# Patient Record
Sex: FEMALE | Race: WHITE | NOT HISPANIC OR LATINO | Employment: OTHER | URBAN - METROPOLITAN AREA
[De-identification: names, ages, dates, MRNs, and addresses within clinical notes are randomized per-mention and may not be internally consistent; named-entity substitution may affect disease eponyms.]

---

## 2023-10-29 ENCOUNTER — HOSPITAL ENCOUNTER (EMERGENCY)
Facility: HOSPITAL | Age: 88
Discharge: HOME | End: 2023-10-29
Attending: EMERGENCY MEDICINE
Payer: MEDICARE

## 2023-10-29 VITALS
SYSTOLIC BLOOD PRESSURE: 132 MMHG | HEIGHT: 65 IN | HEART RATE: 61 BPM | DIASTOLIC BLOOD PRESSURE: 61 MMHG | BODY MASS INDEX: 25.66 KG/M2 | TEMPERATURE: 98 F | WEIGHT: 154 LBS | RESPIRATION RATE: 20 BRPM | OXYGEN SATURATION: 95 %

## 2023-10-29 DIAGNOSIS — R55 SYNCOPE, UNSPECIFIED SYNCOPE TYPE: Primary | ICD-10-CM

## 2023-10-29 DIAGNOSIS — E86.0 DEHYDRATION: ICD-10-CM

## 2023-10-29 LAB
ALBUMIN SERPL BCP-MCNC: 3.2 G/DL (ref 3.5–5.2)
ALP SERPL-CCNC: 51 U/L (ref 55–135)
ALT SERPL W/O P-5'-P-CCNC: 18 U/L (ref 10–44)
ANION GAP SERPL CALC-SCNC: 4 MMOL/L (ref 8–16)
AST SERPL-CCNC: 32 U/L (ref 10–40)
BASOPHILS # BLD AUTO: 0.01 K/UL (ref 0–0.2)
BASOPHILS NFR BLD: 0.3 % (ref 0–1.9)
BILIRUB SERPL-MCNC: 0.5 MG/DL (ref 0.1–1)
BNP SERPL-MCNC: 138 PG/ML (ref 0–99)
BUN SERPL-MCNC: 15 MG/DL (ref 8–23)
CALCIUM SERPL-MCNC: 8.6 MG/DL (ref 8.7–10.5)
CHLORIDE SERPL-SCNC: 109 MMOL/L (ref 95–110)
CO2 SERPL-SCNC: 25 MMOL/L (ref 23–29)
CREAT SERPL-MCNC: 1.3 MG/DL (ref 0.5–1.4)
DIFFERENTIAL METHOD: ABNORMAL
EOSINOPHIL # BLD AUTO: 0 K/UL (ref 0–0.5)
EOSINOPHIL NFR BLD: 0 % (ref 0–8)
ERYTHROCYTE [DISTWIDTH] IN BLOOD BY AUTOMATED COUNT: 13.6 % (ref 11.5–14.5)
EST. GFR  (NO RACE VARIABLE): 40 ML/MIN/1.73 M^2
GLUCOSE SERPL-MCNC: 91 MG/DL (ref 70–110)
HCT VFR BLD AUTO: 36.4 % (ref 37–48.5)
HGB BLD-MCNC: 12.1 G/DL (ref 12–16)
IMM GRANULOCYTES # BLD AUTO: 0 K/UL (ref 0–0.04)
IMM GRANULOCYTES NFR BLD AUTO: 0 % (ref 0–0.5)
INR PPP: 1.1 (ref 0.8–1.2)
LIPASE SERPL-CCNC: 39 U/L (ref 4–60)
LYMPHOCYTES # BLD AUTO: 1 K/UL (ref 1–4.8)
LYMPHOCYTES NFR BLD: 25.6 % (ref 18–48)
MCH RBC QN AUTO: 30 PG (ref 27–31)
MCHC RBC AUTO-ENTMCNC: 33.2 G/DL (ref 32–36)
MCV RBC AUTO: 90 FL (ref 82–98)
MONOCYTES # BLD AUTO: 0.7 K/UL (ref 0.3–1)
MONOCYTES NFR BLD: 18.2 % (ref 4–15)
NEUTROPHILS # BLD AUTO: 2.2 K/UL (ref 1.8–7.7)
NEUTROPHILS NFR BLD: 55.9 % (ref 38–73)
NRBC BLD-RTO: 0 /100 WBC
PLATELET # BLD AUTO: 125 K/UL (ref 150–450)
PMV BLD AUTO: 11 FL (ref 9.2–12.9)
POTASSIUM SERPL-SCNC: 3.8 MMOL/L (ref 3.5–5.1)
PROT SERPL-MCNC: 5.4 G/DL (ref 6–8.4)
PROTHROMBIN TIME: 11.7 SEC (ref 9–12.5)
RBC # BLD AUTO: 4.03 M/UL (ref 4–5.4)
SODIUM SERPL-SCNC: 138 MMOL/L (ref 136–145)
TROPONIN I SERPL HS-MCNC: 14.7 PG/ML (ref 0–14.9)
WBC # BLD AUTO: 3.91 K/UL (ref 3.9–12.7)

## 2023-10-29 PROCEDURE — 83690 ASSAY OF LIPASE: CPT | Performed by: EMERGENCY MEDICINE

## 2023-10-29 PROCEDURE — 83880 ASSAY OF NATRIURETIC PEPTIDE: CPT | Performed by: EMERGENCY MEDICINE

## 2023-10-29 PROCEDURE — 85610 PROTHROMBIN TIME: CPT | Performed by: EMERGENCY MEDICINE

## 2023-10-29 PROCEDURE — 93005 ELECTROCARDIOGRAM TRACING: CPT | Performed by: GENERAL PRACTICE

## 2023-10-29 PROCEDURE — 63600175 PHARM REV CODE 636 W HCPCS: Performed by: EMERGENCY MEDICINE

## 2023-10-29 PROCEDURE — 99285 EMERGENCY DEPT VISIT HI MDM: CPT | Mod: 25

## 2023-10-29 PROCEDURE — 85025 COMPLETE CBC W/AUTO DIFF WBC: CPT | Performed by: EMERGENCY MEDICINE

## 2023-10-29 PROCEDURE — 84484 ASSAY OF TROPONIN QUANT: CPT | Performed by: EMERGENCY MEDICINE

## 2023-10-29 PROCEDURE — 96374 THER/PROPH/DIAG INJ IV PUSH: CPT

## 2023-10-29 PROCEDURE — 93010 ELECTROCARDIOGRAM REPORT: CPT | Mod: ,,, | Performed by: GENERAL PRACTICE

## 2023-10-29 PROCEDURE — 93010 EKG 12-LEAD: ICD-10-PCS | Mod: ,,, | Performed by: GENERAL PRACTICE

## 2023-10-29 PROCEDURE — 80053 COMPREHEN METABOLIC PANEL: CPT | Performed by: EMERGENCY MEDICINE

## 2023-10-29 RX ORDER — ONDANSETRON 2 MG/ML
4 INJECTION INTRAMUSCULAR; INTRAVENOUS
Status: COMPLETED | OUTPATIENT
Start: 2023-10-29 | End: 2023-10-29

## 2023-10-29 RX ADMIN — ONDANSETRON 4 MG: 2 INJECTION INTRAMUSCULAR; INTRAVENOUS at 12:10

## 2023-10-29 NOTE — ED PROVIDER NOTES
Encounter Date: 10/29/2023       History     Chief Complaint   Patient presents with    Loss of Consciousness     Pt has been having diarrhea for the last three days with little PO intake. The daughter stated that the pt had a syncope episode with the pt striking her face on the wall. The pt has no recollection of what happened besides getting weaker this morning than the last couple days.     Patient presents complaining of diarrhea illness.  Patient has been having intermittent diarrhea for the last few days.  Patient felt weak today and had an episode of near-syncope patient fell and hit her head.  There was questionable loss of consciousness.  She complains of pain to the head.  She denies any abdominal pain.  She denies any bloody diarrhea.  At the worst symptoms are moderate.      Review of patient's allergies indicates:   Allergen Reactions    Sulfa (sulfonamide antibiotics)     Opioids - morphine analogues Nausea And Vomiting     No past medical history on file.  No past surgical history on file.  No family history on file.     Review of Systems   All other systems reviewed and are negative.      Physical Exam     Initial Vitals [10/29/23 1155]   BP Pulse Resp Temp SpO2   133/71 63 16 98 °F (36.7 °C) 98 %      MAP       --         Physical Exam    Nursing note and vitals reviewed.  Constitutional: She appears well-developed and well-nourished.   Pleasant, polite   HENT:   Head: Normocephalic.   There is mild swelling and tenderness to the posterior scalp   Eyes: EOM are normal.   Neck: Neck supple.   Normal range of motion.  Cardiovascular:  Normal rate, regular rhythm, normal heart sounds and intact distal pulses.           Pulmonary/Chest: Breath sounds normal. No respiratory distress.   Abdominal: Abdomen is soft.   Musculoskeletal:         General: Normal range of motion.      Cervical back: Normal range of motion and neck supple.     Neurological: She is alert and oriented to person, place, and time.  She has normal strength.   Skin: Skin is warm and dry. Capillary refill takes less than 2 seconds.   Psychiatric: She has a normal mood and affect. Her behavior is normal. Judgment and thought content normal.         ED Course   Procedures  Labs Reviewed   CBC W/ AUTO DIFFERENTIAL - Abnormal; Notable for the following components:       Result Value    Hematocrit 36.4 (*)     Platelets 125 (*)     Mono % 18.2 (*)     All other components within normal limits   COMPREHENSIVE METABOLIC PANEL - Abnormal; Notable for the following components:    Calcium 8.6 (*)     Total Protein 5.4 (*)     Albumin 3.2 (*)     Alkaline Phosphatase 51 (*)     eGFR 40.0 (*)     Anion Gap 4 (*)     All other components within normal limits   B-TYPE NATRIURETIC PEPTIDE - Abnormal; Notable for the following components:     (*)     All other components within normal limits   TROPONIN I HIGH SENSITIVITY   PROTIME-INR   LIPASE   LIPASE        ECG Results              EKG 12-lead (Final result)  Result time 10/29/23 17:49:04      Final result by Interface, Lab In [facility] (10/29/23 17:49:04)                   Narrative:    Test Reason : R07.9,    Vent. Rate : 064 BPM     Atrial Rate : 064 BPM     P-R Int : 240 ms          QRS Dur : 094 ms      QT Int : 450 ms       P-R-T Axes : 061 -46 010 degrees     QTc Int : 464 ms    Sinus rhythm with 1st degree A-V block with Premature atrial complexes  Left anterior fascicular block  Possible Anterior infarct ,age undetermined  Abnormal ECG  No previous ECGs available  Confirmed by Sunita GUTIÉRREZ, Kodi LUGO ([id number]) on 10/29/2023 5:48:56 PM    Referred By: [id number]   SELF           Confirmed By:Kodi Dorsey MD                                  Imaging Results              CT Head Without Contrast (Final result)  Result time 10/29/23 13:16:19      Final result by Brett Uribe MD (10/29/23 13:16:19)                   Narrative:    CMS MANDATED QUALITY DATA-CT RADIATION DOSE-436  All CT scans at this  facility dose modulation, iterative reconstruction, and or weight-based dosing when appropriate to reduce radiation dose to as low as reasonably achievable.    HISTORY: Head trauma, intracranial venous injury suspected    FINDINGS: No prior studies for comparison. There is no acute intracranial hemorrhage, with no mass effect or abnormal extra-axial fluid. Scattered areas of nonspecific hypoattenuation involve the subcortical and deep periventricular white matter, with gray-white differentiation maintained.    There is mild generalized prominence of the cortical sulci and ventricles. The cerebellum and brainstem are unremarkable. There are carotid siphon vascular calcifications. The visualized paranasal sinuses and mastoid air cells are clear. No acute calvarial fracture or scalp hematoma.    IMPRESSION: No acute intracranial hemorrhage or acute calvarial fracture.    Electronically signed by:  Brett Uribe MD  10/29/2023 01:16 PM CDT Workstation: [id number]                                     X-Ray Chest AP Portable (Final result)  Result time 10/29/23 12:55:56      Final result by Brett Uribe MD (10/29/23 12:55:56)                   Narrative:    HISTORY: chest pain    FINDINGS: Portable chest radiograph at 1220 hours with no prior studies for comparison shows the cardiomediastinal silhouette and pulmonary vasculature are within normal limits. There are scattered aortic vascular calcifications.    The lungs are normally expanded, with no consolidation, large pleural effusion, or evidence of pulmonary edema. No confluent infiltrates or pneumothorax. No acute fractures.    IMPRESSION: No evidence of acute cardiopulmonary disease.    Electronically signed by:  Brett Uribe MD  10/29/2023 12:55 PM CDT Workstation: [id number]                                     Medications   ondansetron injection 4 mg (4 mg Intravenous Given 10/29/23 1222)     Medical Decision Making  Patient appears in no acute  distress    Considerations include but are not limited to acute kidney injury, dehydration, electrolyte abnormalities, syncope, near-syncope, dysrhythmia    In the emergency department patient's blood work is within normal limits.  There is no evidence of any severe dehydration.  Patient did receive 1 L IV fluids in the emergency department and feels markedly improved.  Blood work was reviewed.  EKGs shows no evidence of any ST changes.  EKGs regular rate rhythm without ST elevation.  Head CT shows no evidence of any acute abnormality.  Patient was advised oral hydration therapy at home.  Patient has had no ectopy or any evidence of any dysrhythmia in the emergency department.  Likely patient experiencing dehydration and decreased volume causing a near syncopal event.  She was advised to follow up with her primary care doctor.  Patient will be discharged stable condition.  Detailed return precautions discussed.    Amount and/or Complexity of Data Reviewed  Labs: ordered. Decision-making details documented in ED Course.  Radiology: ordered.    Risk  Prescription drug management.               ED Course as of 10/29/23 1820   Sun Oct 29, 2023   1310 Sodium: 138 [AP]   1310 Potassium: 3.8 [AP]   1310 Chloride: 109 [AP]   1310 CO2: 25 [AP]   1310 Glucose: 91 [AP]   1310 BUN: 15 [AP]   1310 Calcium(!): 8.6 [AP]   1310 PROTEIN TOTAL(!): 5.4 [AP]   1310 Albumin(!): 3.2 [AP]   1310 BILIRUBIN TOTAL: 0.5 [AP]   1310 ALP(!): 51 [AP]   1310 AST: 32 [AP]   1310 ALT: 18 [AP]   1310 eGFR(!): 40.0 [AP]   1310 WBC: 3.91 [AP]   1310 Hemoglobin: 12.1 [AP]   1310 Hematocrit(!): 36.4 [AP]   1311 Platelet Count(!): 125 [AP]   1311 INR: 1.1 [AP]   1311 Protime: 11.7 [AP]   1311 Lipase: 39 [AP]   1455 BNP(!): 138 [AP]   1455 Troponin I High Sensitivity: 14.7 [AP]   1455 Lipase: 39 [AP]   1455 Protime: 11.7 [AP]   1455 INR: 1.1 [AP]   1455 WBC: 3.91 [AP]   1455 Hemoglobin: 12.1 [AP]   1455 Hematocrit(!): 36.4 [AP]   1455 Sodium: 138 [AP]    1455 Potassium: 3.8 [AP]   1455 Chloride: 109 [AP]   1455 CO2: 25 [AP]   1455 Glucose: 91 [AP]   1455 BUN: 15 [AP]   1455 Creatinine: 1.3 [AP]   1455 PROTEIN TOTAL(!): 5.4 [AP]   1455 Albumin(!): 3.2 [AP]   1455 BILIRUBIN TOTAL: 0.5 [AP]   1455 Anion Gap(!): 4 [AP]   1455 ALT: 18 [AP]   1455 AST: 32 [AP]   1455 ALP(!): 51 [AP]      ED Course User Index  [AP] Ric Johnson MD                    Clinical Impression:   Final diagnoses:  [R55] Syncope, unspecified syncope type (Primary)  [E86.0] Dehydration        ED Disposition Condition    Discharge Stable          ED Prescriptions    None       Follow-up Information       Follow up With Specialties Details Why Contact Info    Your doctor in the next few days                 Ric Johnson MD  10/29/23 [id number]

## 2023-11-01 ENCOUNTER — HOSPITAL ENCOUNTER (INPATIENT)
Facility: HOSPITAL | Age: 88
LOS: 1 days | Discharge: HOME HEALTH | DRG: 178 | End: 2023-11-03
Attending: EMERGENCY MEDICINE | Admitting: INTERNAL MEDICINE
Payer: MEDICARE

## 2023-11-01 DIAGNOSIS — R40.20 LOSS OF CONSCIOUSNESS: ICD-10-CM

## 2023-11-01 DIAGNOSIS — I48.91 ATRIAL FIBRILLATION, UNSPECIFIED TYPE: Primary | ICD-10-CM

## 2023-11-01 DIAGNOSIS — R55 SYNCOPE: ICD-10-CM

## 2023-11-01 PROBLEM — I10 HYPERTENSIVE DISORDER: Status: ACTIVE | Noted: 2023-11-01

## 2023-11-01 PROBLEM — E78.00 HYPERCHOLESTEROLEMIA: Status: ACTIVE | Noted: 2023-11-01

## 2023-11-01 LAB
ALBUMIN SERPL BCP-MCNC: 3.5 G/DL (ref 3.5–5.2)
ALP SERPL-CCNC: 60 U/L (ref 55–135)
ALT SERPL W/O P-5'-P-CCNC: 14 U/L (ref 10–44)
AMPHET+METHAMPHET UR QL: NEGATIVE
ANION GAP SERPL CALC-SCNC: 4 MMOL/L (ref 8–16)
AST SERPL-CCNC: 22 U/L (ref 10–40)
BARBITURATES UR QL SCN>200 NG/ML: NEGATIVE
BASOPHILS # BLD AUTO: 0.01 K/UL (ref 0–0.2)
BASOPHILS NFR BLD: 0.3 % (ref 0–1.9)
BENZODIAZ UR QL SCN>200 NG/ML: NEGATIVE
BILIRUB SERPL-MCNC: 0.6 MG/DL (ref 0.1–1)
BNP SERPL-MCNC: 193 PG/ML (ref 0–99)
BUN SERPL-MCNC: 19 MG/DL (ref 8–23)
BZE UR QL SCN: NEGATIVE
CALCIUM SERPL-MCNC: 9.6 MG/DL (ref 8.7–10.5)
CANNABINOIDS UR QL SCN: NEGATIVE
CHLORIDE SERPL-SCNC: 108 MMOL/L (ref 95–110)
CK SERPL-CCNC: 76 U/L (ref 20–180)
CO2 SERPL-SCNC: 27 MMOL/L (ref 23–29)
CREAT SERPL-MCNC: 1.1 MG/DL (ref 0.5–1.4)
CREAT UR-MCNC: 60.3 MG/DL (ref 15–325)
DIFFERENTIAL METHOD: ABNORMAL
EOSINOPHIL # BLD AUTO: 0 K/UL (ref 0–0.5)
EOSINOPHIL NFR BLD: 0.9 % (ref 0–8)
ERYTHROCYTE [DISTWIDTH] IN BLOOD BY AUTOMATED COUNT: 13.5 % (ref 11.5–14.5)
EST. GFR  (NO RACE VARIABLE): 48.9 ML/MIN/1.73 M^2
GLUCOSE SERPL-MCNC: 130 MG/DL (ref 70–110)
HCT VFR BLD AUTO: 38.1 % (ref 37–48.5)
HGB BLD-MCNC: 12.7 G/DL (ref 12–16)
IMM GRANULOCYTES # BLD AUTO: 0 K/UL (ref 0–0.04)
IMM GRANULOCYTES NFR BLD AUTO: 0 % (ref 0–0.5)
LIPASE SERPL-CCNC: 50 U/L (ref 4–60)
LYMPHOCYTES # BLD AUTO: 1.4 K/UL (ref 1–4.8)
LYMPHOCYTES NFR BLD: 42.6 % (ref 18–48)
MAGNESIUM SERPL-MCNC: 1.8 MG/DL (ref 1.6–2.6)
MCH RBC QN AUTO: 29.8 PG (ref 27–31)
MCHC RBC AUTO-ENTMCNC: 33.3 G/DL (ref 32–36)
MCV RBC AUTO: 89 FL (ref 82–98)
MONOCYTES # BLD AUTO: 0.4 K/UL (ref 0.3–1)
MONOCYTES NFR BLD: 11.4 % (ref 4–15)
NEUTROPHILS # BLD AUTO: 1.4 K/UL (ref 1.8–7.7)
NEUTROPHILS NFR BLD: 44.8 % (ref 38–73)
NRBC BLD-RTO: 0 /100 WBC
OPIATES UR QL SCN: NEGATIVE
PCP UR QL SCN>25 NG/ML: NEGATIVE
PLATELET # BLD AUTO: 133 K/UL (ref 150–450)
PMV BLD AUTO: 11.1 FL (ref 9.2–12.9)
POTASSIUM SERPL-SCNC: 4 MMOL/L (ref 3.5–5.1)
PROT SERPL-MCNC: 5.9 G/DL (ref 6–8.4)
RBC # BLD AUTO: 4.26 M/UL (ref 4–5.4)
SODIUM SERPL-SCNC: 139 MMOL/L (ref 136–145)
TOXICOLOGY INFORMATION: NORMAL
TROPONIN I SERPL HS-MCNC: 7.4 PG/ML (ref 0–14.9)
TROPONIN I SERPL HS-MCNC: 7.8 PG/ML (ref 0–14.9)
TSH SERPL DL<=0.005 MIU/L-ACNC: 1.32 UIU/ML (ref 0.34–5.6)
WBC # BLD AUTO: 3.17 K/UL (ref 3.9–12.7)

## 2023-11-01 PROCEDURE — 99285 EMERGENCY DEPT VISIT HI MDM: CPT | Mod: 25

## 2023-11-01 PROCEDURE — G0378 HOSPITAL OBSERVATION PER HR: HCPCS

## 2023-11-01 PROCEDURE — 93010 EKG 12-LEAD: ICD-10-PCS | Mod: ,,, | Performed by: INTERNAL MEDICINE

## 2023-11-01 PROCEDURE — 83735 ASSAY OF MAGNESIUM: CPT | Performed by: EMERGENCY MEDICINE

## 2023-11-01 PROCEDURE — 84443 ASSAY THYROID STIM HORMONE: CPT | Performed by: EMERGENCY MEDICINE

## 2023-11-01 PROCEDURE — 25500020 PHARM REV CODE 255: Performed by: EMERGENCY MEDICINE

## 2023-11-01 PROCEDURE — 80307 DRUG TEST PRSMV CHEM ANLYZR: CPT | Performed by: EMERGENCY MEDICINE

## 2023-11-01 PROCEDURE — 82550 ASSAY OF CK (CPK): CPT | Performed by: EMERGENCY MEDICINE

## 2023-11-01 PROCEDURE — 84484 ASSAY OF TROPONIN QUANT: CPT | Performed by: EMERGENCY MEDICINE

## 2023-11-01 PROCEDURE — 85025 COMPLETE CBC W/AUTO DIFF WBC: CPT | Performed by: EMERGENCY MEDICINE

## 2023-11-01 PROCEDURE — 83690 ASSAY OF LIPASE: CPT | Performed by: EMERGENCY MEDICINE

## 2023-11-01 PROCEDURE — 80053 COMPREHEN METABOLIC PANEL: CPT | Performed by: EMERGENCY MEDICINE

## 2023-11-01 PROCEDURE — 93005 ELECTROCARDIOGRAM TRACING: CPT | Performed by: INTERNAL MEDICINE

## 2023-11-01 PROCEDURE — 93010 ELECTROCARDIOGRAM REPORT: CPT | Mod: ,,, | Performed by: INTERNAL MEDICINE

## 2023-11-01 PROCEDURE — 83880 ASSAY OF NATRIURETIC PEPTIDE: CPT | Performed by: EMERGENCY MEDICINE

## 2023-11-01 RX ORDER — LOSARTAN POTASSIUM 50 MG/1
50 TABLET ORAL 2 TIMES DAILY
Status: DISCONTINUED | OUTPATIENT
Start: 2023-11-02 | End: 2023-11-03 | Stop reason: HOSPADM

## 2023-11-01 RX ORDER — DILTIAZEM HYDROCHLORIDE 180 MG/1
180 CAPSULE, COATED, EXTENDED RELEASE ORAL DAILY
Status: DISCONTINUED | OUTPATIENT
Start: 2023-11-02 | End: 2023-11-03 | Stop reason: HOSPADM

## 2023-11-01 RX ORDER — ATORVASTATIN CALCIUM 40 MG/1
40 TABLET, FILM COATED ORAL DAILY
Status: DISCONTINUED | OUTPATIENT
Start: 2023-11-02 | End: 2023-11-03 | Stop reason: HOSPADM

## 2023-11-01 RX ORDER — ONDANSETRON 2 MG/ML
4 INJECTION INTRAMUSCULAR; INTRAVENOUS EVERY 8 HOURS PRN
Status: DISCONTINUED | OUTPATIENT
Start: 2023-11-02 | End: 2023-11-03 | Stop reason: HOSPADM

## 2023-11-01 RX ORDER — FLECAINIDE ACETATE 50 MG/1
50 TABLET ORAL 2 TIMES DAILY
Status: DISCONTINUED | OUTPATIENT
Start: 2023-11-02 | End: 2023-11-03 | Stop reason: HOSPADM

## 2023-11-01 RX ORDER — ATORVASTATIN CALCIUM 40 MG/1
40 TABLET, FILM COATED ORAL DAILY
COMMUNITY
Start: 2023-07-19

## 2023-11-01 RX ORDER — ESTROGENS, CONJUGATED 0.3 MG/1
0.3 TABLET, FILM COATED ORAL DAILY
COMMUNITY
Start: 2023-10-07

## 2023-11-01 RX ORDER — FLECAINIDE ACETATE 50 MG/1
50 TABLET ORAL 2 TIMES DAILY
COMMUNITY
Start: 2023-08-03

## 2023-11-01 RX ORDER — CELECOXIB 100 MG/1
200 CAPSULE ORAL DAILY
Status: DISCONTINUED | OUTPATIENT
Start: 2023-11-02 | End: 2023-11-03 | Stop reason: HOSPADM

## 2023-11-01 RX ORDER — LANOLIN ALCOHOL/MO/W.PET/CERES
800 CREAM (GRAM) TOPICAL
Status: DISCONTINUED | OUTPATIENT
Start: 2023-11-02 | End: 2023-11-03 | Stop reason: HOSPADM

## 2023-11-01 RX ORDER — SODIUM CHLORIDE 9 MG/ML
INJECTION, SOLUTION INTRAVENOUS CONTINUOUS
Status: DISCONTINUED | OUTPATIENT
Start: 2023-11-02 | End: 2023-11-02

## 2023-11-01 RX ORDER — LOSARTAN POTASSIUM 50 MG/1
50 TABLET ORAL 2 TIMES DAILY
COMMUNITY
Start: 2023-08-09

## 2023-11-01 RX ORDER — CELECOXIB 200 MG/1
200 CAPSULE ORAL DAILY
COMMUNITY
Start: 2023-09-14

## 2023-11-01 RX ORDER — CETIRIZINE HYDROCHLORIDE 10 MG/1
10 TABLET ORAL DAILY
COMMUNITY
Start: 2023-10-07

## 2023-11-01 RX ORDER — DILTIAZEM HYDROCHLORIDE 180 MG/1
180 CAPSULE, COATED, EXTENDED RELEASE ORAL DAILY
COMMUNITY
Start: 2023-09-28

## 2023-11-01 RX ORDER — ACETAMINOPHEN 325 MG/1
650 TABLET ORAL EVERY 8 HOURS PRN
Status: DISCONTINUED | OUTPATIENT
Start: 2023-11-02 | End: 2023-11-03 | Stop reason: HOSPADM

## 2023-11-01 RX ORDER — DICLOFENAC SODIUM 10 MG/G
2 GEL TOPICAL 4 TIMES DAILY
COMMUNITY
Start: 2023-09-28

## 2023-11-01 RX ORDER — APIXABAN 5 MG/1
5 TABLET, FILM COATED ORAL 2 TIMES DAILY
COMMUNITY
Start: 2023-10-15

## 2023-11-01 RX ORDER — SODIUM CHLORIDE 0.9 % (FLUSH) 0.9 %
2 SYRINGE (ML) INJECTION
Status: DISCONTINUED | OUTPATIENT
Start: 2023-11-02 | End: 2023-11-03 | Stop reason: HOSPADM

## 2023-11-01 RX ORDER — SODIUM,POTASSIUM PHOSPHATES 280-250MG
2 POWDER IN PACKET (EA) ORAL
Status: DISCONTINUED | OUTPATIENT
Start: 2023-11-02 | End: 2023-11-03 | Stop reason: HOSPADM

## 2023-11-01 RX ORDER — TALC
6 POWDER (GRAM) TOPICAL NIGHTLY PRN
Status: DISCONTINUED | OUTPATIENT
Start: 2023-11-01 | End: 2023-11-03 | Stop reason: HOSPADM

## 2023-11-01 RX ORDER — PROMETHAZINE HYDROCHLORIDE 25 MG/1
25 TABLET ORAL EVERY 6 HOURS PRN
Status: DISCONTINUED | OUTPATIENT
Start: 2023-11-02 | End: 2023-11-03 | Stop reason: HOSPADM

## 2023-11-01 RX ORDER — IBANDRONATE SODIUM 150 MG/1
1 TABLET, FILM COATED ORAL
COMMUNITY
End: 2023-11-01

## 2023-11-01 RX ADMIN — IOHEXOL 100 ML: 350 INJECTION, SOLUTION INTRAVENOUS at 11:11

## 2023-11-01 NOTE — Clinical Note
Diagnosis: Syncope [760229]   Future Attending Provider: RUBIO HANSON [[id number]]   Place in Observation: [facility] [[id number]]   Admitting Provider:: RUBIO ESCALANTE [[id number]]

## 2023-11-02 ENCOUNTER — CLINICAL SUPPORT (OUTPATIENT)
Dept: CARDIOLOGY | Facility: HOSPITAL | Age: 88
DRG: 178 | End: 2023-11-02
Attending: EMERGENCY MEDICINE
Payer: MEDICARE

## 2023-11-02 VITALS — BODY MASS INDEX: 25.66 KG/M2 | HEIGHT: 65 IN | WEIGHT: 154 LBS

## 2023-11-02 LAB
ALBUMIN SERPL BCP-MCNC: 3.2 G/DL (ref 3.5–5.2)
ALP SERPL-CCNC: 54 U/L (ref 55–135)
ALT SERPL W/O P-5'-P-CCNC: 13 U/L (ref 10–44)
ANION GAP SERPL CALC-SCNC: 4 MMOL/L (ref 8–16)
AORTIC ROOT ANNULUS: 2.5 CM
AORTIC VALVE CUSP SEPERATION: 1.8 CM
ASCENDING AORTA: 3.6 CM
AST SERPL-CCNC: 20 U/L (ref 10–40)
AV INDEX (PROSTH): 0.73
AV MEAN GRADIENT: 5 MMHG
AV PEAK GRADIENT: 9 MMHG
AV VALVE AREA BY VELOCITY RATIO: 2.32 CM²
AV VALVE AREA: 2.07 CM²
AV VELOCITY RATIO: 0.82
BASOPHILS # BLD AUTO: 0.02 K/UL (ref 0–0.2)
BASOPHILS NFR BLD: 0.6 % (ref 0–1.9)
BILIRUB SERPL-MCNC: 0.6 MG/DL (ref 0.1–1)
BSA FOR ECHO PROCEDURE: 1.79 M2
BUN SERPL-MCNC: 17 MG/DL (ref 8–23)
CALCIUM SERPL-MCNC: 8.9 MG/DL (ref 8.7–10.5)
CHLORIDE SERPL-SCNC: 109 MMOL/L (ref 95–110)
CO2 SERPL-SCNC: 26 MMOL/L (ref 23–29)
CREAT SERPL-MCNC: 1 MG/DL (ref 0.5–1.4)
CV ECHO LV RWT: 0.77 CM
DIFFERENTIAL METHOD: ABNORMAL
DOP CALC AO PEAK VEL: 1.5 M/S
DOP CALC AO VTI: 33.7 CM
DOP CALC LVOT AREA: 2.8 CM2
DOP CALC LVOT DIAMETER: 1.9 CM
DOP CALC LVOT PEAK VEL: 1.23 M/S
DOP CALC LVOT STROKE VOLUME: 69.71 CM3
DOP CALC MV VTI: 29 CM
DOP CALCLVOT PEAK VEL VTI: 24.6 CM
E WAVE DECELERATION TIME: 200 MSEC
E/A RATIO: 1.15
E/E' RATIO: 8.21 M/S
ECHO LV POSTERIOR WALL: 1.51 CM (ref 0.6–1.1)
EOSINOPHIL # BLD AUTO: 0 K/UL (ref 0–0.5)
EOSINOPHIL NFR BLD: 0.6 % (ref 0–8)
ERYTHROCYTE [DISTWIDTH] IN BLOOD BY AUTOMATED COUNT: 13.3 % (ref 11.5–14.5)
EST. GFR  (NO RACE VARIABLE): 54.9 ML/MIN/1.73 M^2
FRACTIONAL SHORTENING: 28 % (ref 28–44)
GLUCOSE SERPL-MCNC: 82 MG/DL (ref 70–110)
HCT VFR BLD AUTO: 36.3 % (ref 37–48.5)
HGB BLD-MCNC: 12.1 G/DL (ref 12–16)
IMM GRANULOCYTES # BLD AUTO: 0.01 K/UL (ref 0–0.04)
IMM GRANULOCYTES NFR BLD AUTO: 0.3 % (ref 0–0.5)
INTERVENTRICULAR SEPTUM: 1.15 CM (ref 0.6–1.1)
IVC DIAMETER: 1.94 CM
LEFT INTERNAL DIMENSION IN SYSTOLE: 2.81 CM (ref 2.1–4)
LEFT VENTRICLE DIASTOLIC VOLUME INDEX: 37.69 ML/M2
LEFT VENTRICLE DIASTOLIC VOLUME: 66.72 ML
LEFT VENTRICLE MASS INDEX: 106 G/M2
LEFT VENTRICLE SYSTOLIC VOLUME INDEX: 16.8 ML/M2
LEFT VENTRICLE SYSTOLIC VOLUME: 29.81 ML
LEFT VENTRICULAR INTERNAL DIMENSION IN DIASTOLE: 3.92 CM (ref 3.5–6)
LEFT VENTRICULAR MASS: 187.51 G
LV LATERAL E/E' RATIO: 7.09 M/S
LV SEPTAL E/E' RATIO: 9.75 M/S
LVOT MG: 3 MMHG
LVOT MV: 0.75 CM/S
LYMPHOCYTES # BLD AUTO: 2 K/UL (ref 1–4.8)
LYMPHOCYTES NFR BLD: 58.6 % (ref 18–48)
MCH RBC QN AUTO: 29.8 PG (ref 27–31)
MCHC RBC AUTO-ENTMCNC: 33.3 G/DL (ref 32–36)
MCV RBC AUTO: 89 FL (ref 82–98)
MONOCYTES # BLD AUTO: 0.4 K/UL (ref 0.3–1)
MONOCYTES NFR BLD: 11.5 % (ref 4–15)
MV MEAN GRADIENT: 1 MMHG
MV PEAK A VEL: 0.68 M/S
MV PEAK E VEL: 0.78 M/S
MV PEAK GRADIENT: 3 MMHG
MV STENOSIS PRESSURE HALF TIME: 61 MS
MV VALVE AREA BY CONTINUITY EQUATION: 2.4 CM2
MV VALVE AREA P 1/2 METHOD: 3.61 CM2
NEUTROPHILS # BLD AUTO: 1 K/UL (ref 1.8–7.7)
NEUTROPHILS NFR BLD: 28.4 % (ref 38–73)
NRBC BLD-RTO: 0 /100 WBC
OHS LV EJECTION FRACTION SIMPSONS BIPLANE MOD: 57 %
PISA TR MAX VEL: 2.15 M/S
PLATELET # BLD AUTO: 139 K/UL (ref 150–450)
PMV BLD AUTO: 11.2 FL (ref 9.2–12.9)
POTASSIUM SERPL-SCNC: 3.8 MMOL/L (ref 3.5–5.1)
PROT SERPL-MCNC: 5.5 G/DL (ref 6–8.4)
PV MV: 0.65 M/S
PV PEAK GRADIENT: 3 MMHG
PV PEAK VELOCITY: 0.89 M/S
RA PRESSURE ESTIMATED: 3 MMHG
RA PRESSURE: 3 MMHG
RBC # BLD AUTO: 4.06 M/UL (ref 4–5.4)
RV TB RVSP: 5 MMHG
RV TISSUE DOPPLER FREE WALL SYSTOLIC VELOCITY 1 (APICAL 4 CHAMBER VIEW): 14.8 CM/S
SARS-COV-2 RDRP RESP QL NAA+PROBE: POSITIVE
SINUS: 2.57 CM
SODIUM SERPL-SCNC: 139 MMOL/L (ref 136–145)
STJ: 2.17 CM
TDI LATERAL: 0.11 M/S
TDI SEPTAL: 0.08 M/S
TDI: 0.1 M/S
TR MAX PG: 18 MMHG
TRICUSPID ANNULAR PLANE SYSTOLIC EXCURSION: 1.93 CM
TV REST PULMONARY ARTERY PRESSURE: 21 MMHG
VIT B12 SERPL-MCNC: >1500 PG/ML (ref 210–950)
WBC # BLD AUTO: 3.38 K/UL (ref 3.9–12.7)
Z-SCORE OF LEFT VENTRICULAR DIMENSION IN END DIASTOLE: -2.22
Z-SCORE OF LEFT VENTRICULAR DIMENSION IN END SYSTOLE: -0.58

## 2023-11-02 PROCEDURE — 93306 ECHO (CUPID ONLY): ICD-10-PCS | Mod: 26,,, | Performed by: INTERNAL MEDICINE

## 2023-11-02 PROCEDURE — 93306 TTE W/DOPPLER COMPLETE: CPT | Mod: 26,,, | Performed by: INTERNAL MEDICINE

## 2023-11-02 PROCEDURE — 93306 TTE W/DOPPLER COMPLETE: CPT

## 2023-11-02 PROCEDURE — 25000003 PHARM REV CODE 250

## 2023-11-02 PROCEDURE — 80053 COMPREHEN METABOLIC PANEL: CPT

## 2023-11-02 PROCEDURE — 82607 VITAMIN B-12: CPT

## 2023-11-02 PROCEDURE — 21400001 HC TELEMETRY ROOM

## 2023-11-02 PROCEDURE — 85025 COMPLETE CBC W/AUTO DIFF WBC: CPT

## 2023-11-02 PROCEDURE — U0002 COVID-19 LAB TEST NON-CDC: HCPCS | Performed by: INTERNAL MEDICINE

## 2023-11-02 PROCEDURE — 95819 EEG AWAKE AND ASLEEP: CPT

## 2023-11-02 RX ADMIN — LOSARTAN POTASSIUM 50 MG: 50 TABLET, FILM COATED ORAL at 09:11

## 2023-11-02 RX ADMIN — APIXABAN 5 MG: 5 TABLET, FILM COATED ORAL at 09:11

## 2023-11-02 RX ADMIN — ATORVASTATIN CALCIUM 40 MG: 40 TABLET, FILM COATED ORAL at 09:11

## 2023-11-02 RX ADMIN — SODIUM CHLORIDE: 0.9 INJECTION, SOLUTION INTRAVENOUS at 01:11

## 2023-11-02 RX ADMIN — FLECAINIDE ACETATE 50 MG: 50 TABLET ORAL at 09:11

## 2023-11-02 RX ADMIN — DILTIAZEM HYDROCHLORIDE 180 MG: 180 CAPSULE, COATED, EXTENDED RELEASE ORAL at 09:11

## 2023-11-02 NOTE — ASSESSMENT & PLAN NOTE
Patient with atrial fibrillation which is controlled currently with Calcium Channel Blocker and Flecainide. Patient is currently in sinus rhythm.[id number] Score: 3.  Anticoagulation indicated. Anticoagulation done with Home Eliquis.

## 2023-11-02 NOTE — PLAN OF CARE
11/02/23 1025   BLACKMON Message   Medicare Outpatient and Observation Notification regarding financial responsibility Given to patient/caregiver;Explained to patient/caregiver;Signed/date by patient/caregiver   Date BLACKMON was signed 11/02/23   Time BLACKMON was signed [id number]

## 2023-11-02 NOTE — ED PROVIDER NOTES
"Encounter Date: 11/1/2023       History     Chief Complaint   Patient presents with    Loss of Consciousness     Pt was seen in the ED last weekend for the same complaint then discharged with dehydration. The pt had another syncopal episode that lasted 8 minutes, the daughter was able to help pt to the floor. Pt is alert and oriented x3 in no acute distress.     88-year-old female with a history of hypertension, hyperlipidemia and paroxysmal atrial fibrillation presents for evaluation after a syncopal episode.  The patient was sitting on a stool at her daughter's work area/restaurant/bar.  She was not drinking alcohol.  She states she started feeling lightheaded and felt that she might pass out.  She called for assistance and was assisted to the ground.  She did lose consciousness.  She did not injure herself.  Her daughter states that she was unconscious for several minutes and then when she regained consciousness she seemed confused but there was no slurred speech.  She seemed very "sleepy" and did not have any aphasia or slurred speech.  She did not have any visualized seizure type activity or tonic-clonic activity.  She did not bite her tongue and did not have bladder or bowel incontinence.  She denies any preceding or associated chest pain, palpitations or shortness of breath.  She does have a frontal headache currently.  She does get headaches periodically but not frequently.  She denies neck pain or stiffness and denies any visual problems or changes.  She was seen for a similar episode in the emergency room within the past week.  She had had diarrhea at that time and symptoms were attributed to possible dehydration.  She felt better after hydration and was discharged home.  She states she is been feeling well since then until this event.  She is currently on Eliquis over the past 6 months and is not able to provide a definitive history for this but states that it was secondary to an abnormal heart rhythm " that her cardiologist saw on the monitor during a visit--paroxysmal atrial fibrillation suspected.  The patient denies any focal weakness, numbness tingling or paresthesias.  Denies abdominal pain vomiting or diarrhea although had had diarrhea prior to her previous episode.  Denies gastrointestinal bleeding or any urinary problems or changes.  Denies any other problems or complaints.        Review of patient's allergies indicates:   Allergen Reactions    Sulfa (sulfonamide antibiotics)     Opioids - morphine analogues Nausea And Vomiting     No past medical history on file.  No past surgical history on file.  No family history on file.     Review of Systems   Constitutional:  Positive for fatigue. Negative for activity change, appetite change, chills and fever.   HENT: Negative.  Negative for congestion, ear pain, rhinorrhea, sore throat and trouble swallowing.    Eyes: Negative.  Negative for photophobia, pain, redness and visual disturbance.   Respiratory: Negative.  Negative for cough, chest tightness, shortness of breath and wheezing.    Cardiovascular: Negative.  Negative for chest pain, palpitations and leg swelling.   Gastrointestinal: Negative.  Negative for abdominal distention, abdominal pain, blood in stool, constipation, diarrhea, nausea and vomiting.   Endocrine: Negative.    Genitourinary: Negative.  Negative for decreased urine volume, difficulty urinating, dysuria, flank pain, frequency, pelvic pain and urgency.   Musculoskeletal: Negative.  Negative for arthralgias, back pain, gait problem, myalgias, neck pain and neck stiffness.   Skin: Negative.  Negative for rash.   Neurological:  Positive for syncope, light-headedness and headaches. Negative for dizziness, facial asymmetry, speech difficulty, weakness and numbness.   Hematological:  Does not bruise/bleed easily.   Psychiatric/Behavioral: Negative.  Negative for confusion.    All other systems reviewed and are negative.      Physical Exam      Initial Vitals [11/01/23 2000]   BP Pulse Resp Temp SpO2   136/70 66 16 97.7 °F (36.5 °C) 97 %      MAP       --         Physical Exam    Nursing note and vitals reviewed.  Constitutional: She is not diaphoretic. She is active and cooperative.  Non-toxic appearance. She does not have a sickly appearance. She does not appear ill. No distress.   HENT:   Head: Normocephalic and atraumatic.   Right Ear: Tympanic membrane normal.   Left Ear: Tympanic membrane normal.   Nose: Nose normal.   Mouth/Throat: Uvula is midline, oropharynx is clear and moist and mucous membranes are normal. No oral lesions. No uvula swelling. No oropharyngeal exudate, posterior oropharyngeal edema or posterior oropharyngeal erythema.   Eyes: Conjunctivae, EOM and lids are normal. Pupils are equal, round, and reactive to light. No scleral icterus.   Neck: Trachea normal and phonation normal. Neck supple. No thyroid mass and no thyromegaly present. No stridor present. No JVD present.   Normal range of motion.   Full passive range of motion without pain.     Cardiovascular:  Normal rate, regular rhythm, normal heart sounds, intact distal pulses and normal pulses.     Exam reveals no gallop, no distant heart sounds, no friction rub and no decreased pulses.       No murmur heard.  Pulmonary/Chest: Effort normal and breath sounds normal. No accessory muscle usage or stridor. No tachypnea. No respiratory distress. She has no wheezes. She has no rhonchi. She has no rales.   Abdominal: Abdomen is soft. Bowel sounds are normal. She exhibits no distension, no pulsatile midline mass and no mass. There is no abdominal tenderness. There is no rigidity and no guarding.   Musculoskeletal:         General: No tenderness or edema. Normal range of motion.      Right hand: Normal. Normal range of motion. Normal strength. Normal sensation. Normal capillary refill. Normal pulse.      Left hand: Normal. Normal range of motion. Normal strength. Normal sensation.  Normal capillary refill. Normal pulse.      Cervical back: Normal, full passive range of motion without pain, normal range of motion and neck supple. No edema, erythema, rigidity or bony tenderness. No spinous process tenderness or muscular tenderness. Normal range of motion.      Thoracic back: Normal. No bony tenderness. Normal range of motion.      Lumbar back: Normal. No bony tenderness. Normal range of motion.      Right foot: Normal. Normal range of motion and normal capillary refill. No tenderness or bony tenderness. Normal pulse.      Left foot: Normal. Normal range of motion and normal capillary refill. No tenderness or bony tenderness. Normal pulse.      Comments: Pulses are 2+ throughout, cap refill is less than 2 sec throughout, extremities are nontender throughout with full range of motion. There is no spinal tenderness to palpation.     Neurological: She is alert and oriented to person, place, and time. She has normal strength. She displays normal reflexes. No cranial nerve deficit or sensory deficit. Gait normal.   No focal deficits.   Skin: Skin is warm, dry and intact. Capillary refill takes less than 2 seconds. No ecchymosis, no petechiae and no rash noted. No erythema. No pallor.   Psychiatric: She has a normal mood and affect. Her speech is normal and behavior is normal. Judgment and thought content normal. Cognition and memory are normal.         ED Course   Procedures  Labs Reviewed   CBC W/ AUTO DIFFERENTIAL - Abnormal; Notable for the following components:       Result Value    WBC 3.17 (*)     Platelets 133 (*)     Gran # (ANC) 1.4 (*)     All other components within normal limits   COMPREHENSIVE METABOLIC PANEL - Abnormal; Notable for the following components:    Glucose 130 (*)     Total Protein 5.9 (*)     eGFR 48.9 (*)     Anion Gap 4 (*)     All other components within normal limits   B-TYPE NATRIURETIC PEPTIDE - Abnormal; Notable for the following components:     (*)     All  other components within normal limits   LIPASE   CK   MAGNESIUM   TSH   TROPONIN I HIGH SENSITIVITY   D DIMER, QUANTITATIVE   DRUG SCREEN PANEL, URINE EMERGENCY   TROPONIN I HIGH SENSITIVITY   POCT CREATININE          Imaging Results              CT Head Without Contrast (Final result)  Result time 11/01/23 22:00:05      Final result by Lobito Crowell MD (11/01/23 22:00:05)                   Narrative:    EXAM DESCRIPTION:  CT HEAD WITHOUT CONTRAST  RadLex: CT HEAD WITHOUT IV CONTRAST    CLINICAL HISTORY:  88 years  Female;  Syncope, recurrent    TECHNOLOGIST NOTES: Loss of Consciousness (Pt was seen in the ED last weekend for the same complaint then discharged with dehydration. The pt had another syncopal episode that lasted 8 minutes, the daughter was able to help pt to the floor. Pt is alert and oriented x3 in; no acute distress.)    COMPARISONS: 10/29/2023    TECHNIQUE: Axial CT images were obtained from the skull base to vertex. This exam was performed according to our departmental dose-optimization program, which includes automated exposure control, adjustment of the mA and/or kV according to patient size and/or use of iterative reconstruction techniques.    FINDINGS:  No acute intracranial hemorrhage. No mass effect, midline shift or hydrocephalus. The gray-white matter differentiation is grossly unremarkable. No evidence of acute large territory infarct.   Within the broad range of normal, brain volume is age appropriate. Nonspecific low attenuation in the white matter bilaterally. This is most commonly related to age-indeterminate small vessel ischemic disease. The visualized paranasal sinuses are grossly unremarkable. The mastoid air cells are clear.      IMPRESSION:  1.  No acute intracranial process is identified.  2.  Nonspecific low attenuation in the white matter bilaterally. This is most commonly related to age-indeterminate small vessel ischemic disease,  3.  If symptoms persist or further imaging is  clinically indicated, consider follow-up MRI or repeat CT imaging    Electronically signed by:  Lobito Crowell MD  11/01/2023 10:00 PM CDT Workstation: [id number]                                     X-Ray Chest AP Portable (In process)                      Medications - No data to display  Medical Decision Making  Emergent evaluation of an 88-year-old female with complaints as per HPI.  She is hemodynamically stable.  She does not currently appear dehydrated.  She denies chest pain or shortness of breath.  She is well-appearing and in no distress.  Has a mild headache, no fever, photophobia, neck pain or stiffness or any focal weakness, numbness tingling or paresthesias.  This is the patient's 2nd syncopal episode in a week.  Was discharged after ED evaluation for 1st syncopal episode.  Is currently visiting her daughter from out of town.  Secondary to cardiac risk factors and current constellation of symptoms I have discussed the case with the hospitalist provider who has assumed care and will admit.  Orthostatics have been ordered and are pending.  CT scan of the brain is negative for evidence of acute intracranial abnormality.  EKG is negative for evidence of acute ischemia.  Troponin is not elevated.  No acute electrolyte abnormalities noted.    Amount and/or Complexity of Data Reviewed  Labs: ordered. Decision-making details documented in ED Course.  Radiology: ordered. Decision-making details documented in ED Course.  ECG/medicine tests:  Decision-making details documented in ED Course.                               Clinical Impression:   Final diagnoses:  [R55] Syncope               Janae Puentes MD  11/01/23 [id number]

## 2023-11-02 NOTE — CONSULTS
[facility]  Department of Neurology  Neurology Consultation Note        PATIENT NAME: Jessi Christianson  MRN: [id number]  CSN: [id number]      TODAY'S DATE: 11/02/2023  ADMIT DATE: 11/1/2023                            CONSULTING PROVIDER: Josue Rollins MD  CONSULT REQUESTED BY: June Gomez MD      Reason for consult: Syncopal spells       History obtained from chart review, the patient, and daughter.    HPI per EMR: Jessi Christianson is a 88 y.o. female with a history of atrial fibrillation, hypercholesterolemia, and HTN presents to the ED after losing consciousness for 8-10 minutes. Patient states that she has been traveling since 10/17 to visit several family members and she is currently in town visiting her daughter. Patient states that she was sitting on a stool at the house when she suddenly felt very hot, started to sweat, and then passed out. Patient's family was able to catch her and ease her to the ground however they stated that she continued to breath but was unconscious for several minutes and was even somnolent in EMS on the way to the ED. Patient states that she had a headache afterwards but denies any cough, SOB, chest pain, abdominal pain, nausea, vomiting, or diarrhea. Patient states that she has been eating normally and drank lots of water yesterday however she did not hydrate much today.       Patient was recently seen 10/29/2023 in the ED for a shorter episode of loss of consciousness, was found to be dehydrated, given fluids, and sent home.      ED: Vitals showed HR 66, /70, RR 16 saturating at 97% on RA. Labs were significant for troponin 7.4, second troponin 7.8, , WBC 3.17, and . CT head showed no acute intracranial process and Nonspecific low attenuation in the white matter bilaterally.    Neurology consult:  Patient was seen examined by me.  She presented to the hospital after episode of syncopal spell that happened yesterday while she was sitting on the  stool and suddenly felt like room spinning sensation and then she passed out.  She was unconscious for about 5-7 minutes after which she started to wake up however was somnolent after.  She felt very hot and sweaty prior to passing out.  Patient's family was able to catch her and helped her to the ground.  There was no generalized tonic-clonic activity.    Two days prior to this episode, she had a similar episode while she was standing in her daughter's office room when she passed out and she was brought to the hospital and she was found to be dehydrated.  She was given fluids and discharged home.    Patient denies any headache, vision loss, vision changes, speech trouble, nausea, vomiting, unilateral weakness or sensory changes.  Currently she feels back to baseline.    PREVIOUS MEDICAL HISTORY:  No past medical history on file.  PREVIOUS SURGICAL HISTORY:  No past surgical history on file.  FAMILY MEDICAL HISTORY:  No family history on file.  SOCIAL HISTORY:     ALLERGIES:  Review of patient's allergies indicates:   Allergen Reactions    Sulfa (sulfonamide antibiotics)     Opioids - morphine analogues Nausea And Vomiting     HOME MEDICATIONS:  Prior to Admission medications    Medication Sig Start Date End Date Taking? Authorizing Provider   atorvastatin (LIPITOR) 40 MG tablet Take 40 mg by mouth once daily. 7/19/23  Yes Provider, Historical   celecoxib (CELEBREX) 200 MG capsule Take 200 mg by mouth once daily. 9/14/23  Yes Provider, Historical   cetirizine (ZYRTEC) 10 MG tablet Take 10 mg by mouth once daily. 10/7/23  Yes Provider, Historical   diclofenac sodium (VOLTAREN) 1 % Gel Apply 2 g topically 4 (four) times daily. 9/28/23  Yes Provider, Historical   diltiaZEM (CARDIZEM CD) 180 MG 24 hr capsule Take 180 mg by mouth once daily. 9/28/23  Yes Provider, Historical   ELIQUIS 5 mg Tab Take 5 mg by mouth 2 (two) times daily. 10/15/23  Yes Provider, Historical   flecainide (TAMBOCOR) 50 MG Tab Take 50 mg by mouth  2 (two) times daily. 8/3/23  Yes Provider, Historical   losartan (COZAAR) 50 MG tablet Take 50 mg by mouth 2 (two) times daily. 8/9/23  Yes Provider, Historical   PREMARIN 0.3 mg tablet Take 0.3 mg by mouth once daily. 10/7/23   Provider, Historical     CURRENT SCHEDULED MEDICATIONS:   apixaban  5 mg Oral BID    atorvastatin  40 mg Oral Daily    celecoxib  200 mg Oral Daily    diltiaZEM  180 mg Oral Daily    flecainide  50 mg Oral BID    losartan  50 mg Oral BID     CURRENT INFUSIONS:   sodium chloride 0.9% 100 mL/hr at 11/02/23 0127     CURRENT PRN MEDICATIONS:  acetaminophen, acetaminophen, magnesium oxide, magnesium oxide, melatonin, ondansetron, potassium bicarbonate, potassium bicarbonate, potassium bicarbonate, potassium, sodium phosphates, potassium, sodium phosphates, potassium, sodium phosphates, promethazine, sodium chloride 0.9%    REVIEW OF SYSTEMS:  Please refer to the HPI for all pertinent positive and negative findings. A comprehensive review of all other systems was negative.       PHYSICAL EXAM:  Patient Vitals for the past 24 hrs:   BP Temp Temp src Pulse Resp SpO2 Height Weight   11/02/23 0900 (!) 156/70 -- -- 61 (!) 31 97 % -- --   11/02/23 0830 (!) 172/77 -- -- 64 (!) 30 98 % -- --   11/02/23 0730 (!) 165/72 97.9 °F (36.6 °C) Oral (!) 53 19 97 % -- --   11/02/23 0700 (!) 153/70 -- -- (!) 50 17 98 % -- --   11/02/23 0631 (!) 171/79 -- -- 61 18 99 % -- --   11/02/23 0600 (!) 141/67 -- -- (!) 51 15 96 % -- --   11/02/23 0530 (!) 155/70 -- -- (!) 54 19 96 % -- --   11/02/23 0500 (!) 163/70 -- -- (!) 53 14 97 % -- --   11/02/23 0430 (!) 156/72 -- -- (!) 58 18 99 % -- --   11/02/23 0400 (!) 144/64 -- -- (!) 50 16 96 % -- --   11/02/23 0330 (!) 165/70 -- -- (!) 52 20 98 % -- --   11/02/23 0100 (!) 155/74 -- -- (!) 59 18 99 % -- --   11/01/23 2300 (!) 175/73 -- -- (!) 55 -- -- -- --   11/01/23 2245 -- -- -- 60 19 100 % -- --   11/01/23 2229 (!) 164/77 -- -- 65 -- -- -- --   11/01/23 2227 (!) 163/76 --  "-- 60 -- -- -- --   11/01/23 2225 (!) 162/72 -- -- (!) 58 -- -- -- --   11/01/23 2100 (!) 153/70 -- -- 61 19 97 % -- --   11/01/23 2000 136/70 97.7 °F (36.5 °C) Oral 66 16 97 % 5' 5" (1.651 m) 69.9 kg (154 lb)       GENERAL APPEARANCE: Alert, well-developed, well-nourished female in no acute distress.  HEENT: Normocephalic and atraumatic. PERRL. Oropharynx unremarkable.  PULM: Normal respiratory effort. No accessory muscle use.  CV: RRR.  ABDOMEN: Soft, nontender.  EXTREMITIES: No obvious signs of vascular compromise. Pulses present. No cyanosis, clubbing or edema.  SKIN: Clear; no rashes, lesions or skin breaks in exposed areas.    NEURO:  MENTAL STATUS: Patient awake and oriented to time, place, and person, recent/remote memory normal, attention span/concentration normal, and speech fluent without paraphasic errors.  Affect euthymic.    CRANIAL NERVES:  CN I: Not tested.  CN II: Fundoscopic exam deferred.  CN III, IV, VI: Pupils equal, round and reactive to light.  Extraocular movements full and intact.  CN V: Facial sensation normal.  CN VII: Facial asymmetry absent.  CN VIII: Hearing grossly normal and equal bilaterally.  No skew deviation or pathologic nystagmus.  CN IX, X: Palate elevates symmetrically. Speech/articulation is clear without dysarthria.  CN XI: Shoulder shrug and chin rotation equal with good strength.  CN XII: Tongue protrusion midline.    MOTOR:  Bulk normal. Tone normal and symmetric throughout.  Abnormal movements absent.  Tremor: none present.  Strength 5/5 throughout.    REFLEXES:  DTRs 2+ throughout.  Plantar response downgoing bilaterally.  SENSATION: grossly intact throughout.  COORDINATION: normal finger-to-nose.  STATION: not tested.  GAIT: not tested.    Labs:  Recent Labs   Lab 10/29/23  1208 11/01/23 2027 11/02/23  0427    139 139   K 3.8 4.0 3.8    108 109   CO2 25 27 26   BUN 15 19 17   CREATININE 1.3 1.1 1.0   GLU 91 130* 82   CALCIUM 8.6* 9.6 8.9   MG  --  1.8  --  " "    Recent Labs   Lab 10/29/23  1208 11/01/23 2027 11/02/23  0427   WBC 3.91 3.17* 3.38*   HGB 12.1 12.7 12.1   HCT 36.4* 38.1 36.3*   * 133* 139*     Recent Labs   Lab 10/29/23  1208 11/01/23 2027 11/02/23  0427   ALBUMIN 3.2* 3.5 3.2*   PROT 5.4* 5.9* 5.5*   BILITOT 0.5 0.6 0.6   ALKPHOS 51* 60 54*   ALT 18 14 13   AST 32 22 20     Lab Results   Component Value Date    INR 1.1 10/29/2023     No results found for: "CHOLTOT", "TRIG", "HDL", "CHOLHDL", "LDL"  No results found for: "HGBA1C"  No results found for: "PROTEINCSF", "GLUCCSF", "WBCCSF"    Imaging:  I have reviewed and interpreted the pertinent imaging and lab results.      X-Ray Chest AP Portable  XR CHEST 1 VIEW    CLINICAL HISTORY:  88 years Female Chest Pain    COMPARISON: CT chest November 1, 2023    FINDINGS: Cardiac silhouette size is borderline enlarged. Atherosclerotic calcification of the aorta. No airspace consolidation, pleural effusion, or pneumothorax. Osteopenia. No acute osseous abnormality.    IMPRESSION:    No acute pulmonary pathology.    Electronically signed by:  Sridhar Hermosillo MD  11/02/2023 07:04 AM CDT Workstation: [id number]  US Carotid Bilateral  EXAM:  US Duplex Bilateral Extracranial Arteries  CLINICAL HISTORY:  88 years old, Female; syncope;  TECHNIQUE:  Real-time duplex ultrasound scan of the extracranial arteries integrating B-mode two-dimensional vascular structure, Doppler spectral analysis and color flow Doppler imaging.  COMPARISON:  No relevant prior studies available.    FINDINGS:    Normal antegrade flow in the bilateral carotid and left vertebral arteries. Image of the right vertebral artery not available.  Mild plaque in the carotid bulbs bilaterally.    Lowest peak CCA systolic velocity measurements are listed below (cm/sec):  Right: [96 ],  Left: [ 61]  Highest peak ICA systolic velocity measurements are listed below (cm/sec):  Right: [ 49-58],  Left: [ ]  ICA/CCA ratios:  Right: [0.6 ],  Left: [ " 1.8]    IMPRESSION:  No evidence of hemodynamically significant stenosis bilaterally (less than 50%).    Electronically signed by:  Alvarado España MD  11/02/2023 12:49 AM CDT Workstation: [id number]         ASSESSMENT & PLAN:    Syncopal spells    Plan:   Etiology of syncopal spells is unknown.  There was no tonic-clonic activity with either of the syncopal spells.  No prior history of seizures and hence concern for seizures as a cause of syncopal spells is low.  EEG ordered and pending  CT head was negative for acute intracranial pathology, Carotid ultrasound did not reveal any evidence of significant stenosis bilaterally  Cardiac workup for syncopal spells  Check orthostatic vitals  PT OT evaluate and treat  Will follow         Thank you kindly for including us in the care of this patient. Please do not hesitate to contact us with any questions.           Josue Rollins MD  Neurology/vascular Neurology  Date of Service: 11/02/2023  10:13 AM    --------------------------------------------------------------------------------------------------------------------------------------------------------------------------------------------------------------------------------------------------------------  Please note: This note was transcribed using voice recognition software. Because of this technology there are often uinintended grammatical, spelling, and other transcription errors. Please disregard these errors.

## 2023-11-02 NOTE — SUBJECTIVE & OBJECTIVE
No past medical history on file.    No past surgical history on file.    Review of patient's allergies indicates:   Allergen Reactions    Sulfa (sulfonamide antibiotics)     Opioids - morphine analogues Nausea And Vomiting       No current facility-administered medications on file prior to encounter.     Current Outpatient Medications on File Prior to Encounter   Medication Sig    atorvastatin (LIPITOR) 40 MG tablet Take 40 mg by mouth once daily.    celecoxib (CELEBREX) 200 MG capsule Take 200 mg by mouth once daily.    cetirizine (ZYRTEC) 10 MG tablet Take 10 mg by mouth once daily.    diclofenac sodium (VOLTAREN) 1 % Gel Apply 2 g topically 4 (four) times daily.    diltiaZEM (CARDIZEM CD) 180 MG 24 hr capsule Take 180 mg by mouth once daily.    ELIQUIS 5 mg Tab Take 5 mg by mouth 2 (two) times daily.    flecainide (TAMBOCOR) 50 MG Tab Take 50 mg by mouth 2 (two) times daily.    losartan (COZAAR) 50 MG tablet Take 50 mg by mouth 2 (two) times daily.    PREMARIN 0.3 mg tablet Take 0.3 mg by mouth once daily.    [DISCONTINUED] ibandronate (BONIVA) 150 mg tablet Take 1 tablet by mouth every 30 days.     Family History    None       Tobacco Use    Smoking status: Not on file    Smokeless tobacco: Not on file   Substance and Sexual Activity    Alcohol use: Not on file    Drug use: Not on file    Sexual activity: Not on file     Review of Systems   Constitutional:  Negative for activity change, appetite change, chills, diaphoresis, fatigue and fever.   HENT:  Negative for congestion, ear discharge, ear pain, postnasal drip, rhinorrhea, sinus pressure and sore throat.    Eyes:  Negative for pain, discharge, redness and itching.   Respiratory:  Negative for cough, chest tightness and shortness of breath.    Cardiovascular:  Negative for chest pain and leg swelling.   Gastrointestinal:  Negative for abdominal pain, constipation, diarrhea, nausea and vomiting.   Genitourinary:  Negative for dysuria, frequency, hematuria and  urgency.   Musculoskeletal:  Negative for back pain.   Skin:  Negative for color change, pallor and rash.   Neurological:  Positive for syncope, light-headedness and headaches. Negative for dizziness, facial asymmetry, speech difficulty, weakness and numbness.   Psychiatric/Behavioral:  Negative for behavioral problems, confusion and hallucinations.      Objective:     Vital Signs (Most Recent):  Temp: 97.7 °F (36.5 °C) (11/01/23 2000)  Pulse: (!) 55 (11/01/23 2300)  Resp: 19 (11/01/23 2245)  BP: (!) 175/73 (11/01/23 2300)  SpO2: 100 % (11/01/23 2245) Vital Signs (24h Range):  Temp:  [97.7 °F (36.5 °C)] 97.7 °F (36.5 °C)  Pulse:  [55-66] 55  Resp:  [16-19] 19  SpO2:  [97 %-100 %] 100 %  BP: (136-175)/(70-77) 175/73     Weight: 69.9 kg (154 lb)  Body mass index is 25.63 kg/m².     Physical Exam  Vitals and nursing note reviewed.   Constitutional:       General: She is not in acute distress.     Appearance: Normal appearance. She is not ill-appearing, toxic-appearing or diaphoretic.   HENT:      Head: Normocephalic and atraumatic.      Nose: Nose normal.      Mouth/Throat:      Mouth: Mucous membranes are moist.   Eyes:      Extraocular Movements: Extraocular movements intact.   Cardiovascular:      Rate and Rhythm: Normal rate and regular rhythm.      Heart sounds: Normal heart sounds. No murmur heard.  Pulmonary:      Effort: Pulmonary effort is normal. No respiratory distress.      Breath sounds: Normal breath sounds. No wheezing.   Abdominal:      General: Abdomen is flat. Bowel sounds are normal.      Palpations: Abdomen is soft. There is no mass.      Tenderness: There is no abdominal tenderness. There is no guarding.      Hernia: No hernia is present.   Musculoskeletal:         General: No swelling, tenderness or deformity. Normal range of motion.      Cervical back: Normal range of motion. No rigidity or tenderness.   Skin:     General: Skin is warm and dry.      Coloration: Skin is not jaundiced.       Findings: No bruising or erythema.   Neurological:      General: No focal deficit present.      Mental Status: She is alert and oriented to person, place, and time. Mental status is at baseline.   Psychiatric:         Mood and Affect: Mood normal.         Behavior: Behavior normal.                Significant Labs: All pertinent labs within the past 24 hours have been reviewed.  CBC:   Recent Labs   Lab 11/01/23 2027   WBC 3.17*   HGB 12.7   HCT 38.1   *     CMP:   Recent Labs   Lab 11/01/23 2027      K 4.0      CO2 27   *   BUN 19   CREATININE 1.1   CALCIUM 9.6   PROT 5.9*   ALBUMIN 3.5   BILITOT 0.6   ALKPHOS 60   AST 22   ALT 14   ANIONGAP 4*     Cardiac Markers:   Recent Labs   Lab 11/01/23 2027   *     Magnesium:   Recent Labs   Lab 11/01/23 2027   MG 1.8     Troponin:   Recent Labs   Lab 11/01/23 2027 11/01/23  2223   TROPONINIHS 7.4 7.8     TSH:   Recent Labs   Lab 11/01/23 2027   TSH 1.317       Significant Imaging: I have reviewed all pertinent imaging results/findings within the past 24 hours.

## 2023-11-02 NOTE — PROGRESS NOTES
[facility] Medicine  Progress Note    Patient name: Jessi Christianson  MRN: [id number]  Admit Date: 11/1/2023   LOS: 0 days     SUBJECTIVE:     Principal problem: Loss of consciousness    Interval History:  89 y/o female with history of atrial fibrillation, hypercholesterolemia, and HTN presents to the ED after losing consciousness for 8-10 minutes. She was seen in the ED 10/29 also for syncope but was found to be volume depleted, given IVFs and discharged home. She had a second episode yesterday.  She reports episodic lightheadedness but none recently and it does not sound like she's had an official workup. No events thus far on tele monitoring.  Echo done with results pending.  Troponin x 2 is normal. Orthostatics in the ED were normal. CT head with no acute process. CTA chest done given recent travel- no PE but had bilateral ground glass opacities.  She does report cough for one week.  No fevers.  Some loose stool approximately 5 days ago but none since. Eating and drinking okay. Carotid US with no significant stenosis.  Neurology consulted.  EEG ordered.  No reported Hx of seizure.     Hospital Course:    Scheduled Meds:   apixaban  5 mg Oral BID    atorvastatin  40 mg Oral Daily    celecoxib  200 mg Oral Daily    diltiaZEM  180 mg Oral Daily    flecainide  50 mg Oral BID    losartan  50 mg Oral BID     Continuous Infusions:  PRN Meds:acetaminophen, acetaminophen, magnesium oxide, magnesium oxide, melatonin, ondansetron, potassium bicarbonate, potassium bicarbonate, potassium bicarbonate, potassium, sodium phosphates, potassium, sodium phosphates, potassium, sodium phosphates, promethazine, sodium chloride 0.9%    Review of patient's allergies indicates:   Allergen Reactions    Sulfa (sulfonamide antibiotics)     Opioids - morphine analogues Nausea And Vomiting       Review of Systems: As per interval history    OBJECTIVE:     Vital Signs (Most Recent)  Temp: 97.9 °F (36.6 °C) (11/02/23  "0730)  Pulse: 61 (11/02/23 0900)  Resp: (!) 31 (11/02/23 0900)  BP: (!) 156/70 (11/02/23 0900)  SpO2: 97 % (11/02/23 0900)    Vital Signs Range (Last 24H):  Temp:  [97.7 °F (36.5 °C)-97.9 °F (36.6 °C)]   Pulse:  [50-66]   Resp:  [14-31]   BP: (136-175)/(64-79)   SpO2:  [96 %-100 %]     I & O (Last 24H):No intake or output data in the 24 hours ending 11/02/23 1520    Physical Exam:    Vitals and nursing note reviewed.     Constitutional:       General: Not in acute distress.     Appearance: Well-developed.   HENT:      Head: Normocephalic and atraumatic.   Eyes:      Pupils: Pupils are equal, round, and reactive to light.   Cardiovascular:      Rate and Rhythm: Regular rhythm.   Pulmonary:      Effort: Pulmonary effort is normal.      Breath sounds: Normal breath sounds. No wheezing.   Abdominal:      General: There is no distension.      Palpations: Abdomen is soft.      Tenderness: There is no abdominal tenderness. There is no guarding or rebound.   Musculoskeletal:         General: Normal range of motion.      Cervical back: Normal range of motion.   Skin:     Findings: No rash.   Neurological:      Mental Status: Alert and oriented to person, place, and time.      Cranial Nerves: No cranial nerve deficit.      Sensory: No sensory deficit.     Laboratory:  I have reviewed all pertinent lab results within the past 24 hours.  CBC:   Recent Labs   Lab 11/02/23 0427   WBC 3.38*   RBC 4.06   HGB 12.1   HCT 36.3*   *   MCV 89   MCH 29.8   MCHC 33.3     CMP:   Recent Labs   Lab 11/02/23 0427   GLU 82   CALCIUM 8.9   ALBUMIN 3.2*   PROT 5.5*      K 3.8   CO2 26      BUN 17   CREATININE 1.0   ALKPHOS 54*   ALT 13   AST 20   BILITOT 0.6     Cardiac markers: No results for input(s): "CKMB", "CPKMB", "TROPONINT", "TROPONINI", "MYOGLOBIN" in the last 168 hours.    Diagnostic Results:      ASSESSMENT/PLAN:         Active Hospital Problems    Diagnosis  POA    *Loss of consciousness [R40.20]  Yes    " Hypertensive disorder [I10]  Yes    Hypercholesterolemia [E78.00]  Yes    Atrial fibrillation [I48.91]  Yes      Resolved Hospital Problems   No resolved problems to display.         Plan:     -Admitted with recurrent syncope.    -Echo done with results pending  -tele monitoring  -s/p IVFs. Orthostatics seemingly negative in the ED.  I'm repeating for completeness.  -Troponin negative x 2 and thus acute MI ruled out  -CTA chest given recent travel.  No PE.  Bilateral opacities seen. Coughing but non toxic appearing.  Not hypoxic.  I have ordered covid screen for completeness given syncope associated with covid.  I do not have plans to start abx.  I do not think this is bacterial.   -Neurology consulted and following.  EEG to eval for seizure activity. No prior Hx of seizures.  -Anticoagulated with Eliquis  -Has upcoming apt this month with her cardiologist and PCP.      VTE Risk Mitigation (From admission, onward)           Ordered     apixaban tablet 5 mg  2 times daily         11/01/23 2307     IP VTE HIGH RISK PATIENT  Once         11/01/23 2307     Place sequential compression device  Until discontinued         11/01/23 2307                      Department Hospital Medicine  [facility]  June Gomez MD  Date of service: 11/02/2023

## 2023-11-02 NOTE — ASSESSMENT & PLAN NOTE
Patient presented to the ED after losing consciousness for 8-10 minutes  Patient states that she was sitting in the house and suddenly felt very hot, sweaty, and lightheaded and then passed out  Patient was hard to arouse in the ambulance on the way to the ED but on arrival was more responsive and complaining of a headache    ECG: Sinus rhythm with 1st degree A-V block, Incomplete right bundle branch block, and Left anterior fascicular block    CT Head   1.  No acute intracranial process is identified.  2.  Nonspecific low attenuation in the white matter bilaterally. This is most commonly related to age-indeterminate small vessel ischemic disease,    CTA chest   1. No pulmonary embolus.  2. Bilateral nonfocal groundglass opacities with mild interlobular septal thickening. Differential includes edema versus pneumonia.    Troponin 7.4  - second troponin 7.8     Drug screen negative     Fall precautions   Seizure precautions    ECHO ordered    Neurology consulted  - EEG ordered

## 2023-11-02 NOTE — PLAN OF CARE
[facility] - Emergency Dept  Initial Discharge Assessment       Primary Care Provider: No primary care provider on file.    Admission Diagnosis: Syncope [R55]    Admission Date: 11/1/2023  Expected Discharge Date:     Pt is an 88-year-old female who arrived from home with Loss of consciousness. Pt lives with her daughter, Mari Middleton [phone]. Pt is oriented to person, place, and time. Pt is capable of performing ADLs without assistance. Pt's family drives her to and from medical appointment. PCP last seen six weeks ago. Pt uses a walker. Pt denies Coumadin, Dialysis, HH, and  readmitted into the hospital in the past thirty day. Pt takes Eliquis and all other medication as prescribed; pharmacy is [facility].  Pt's daughter will assist patient at discharge. CM will continue to monitor.      Transition of Care Barriers: None    Payor: MEDICARE / Plan: MEDICARE PART A & B / Product Type: Government /     Extended Emergency Contact Information  Primary Emergency Contact: Mari Middleton  Mobile Phone: [phone]  Relation: Daughter  Preferred language: English   needed? No    Discharge Plan A: Home with family  Discharge Plan B: Home      [facility] - [address] [address]  Phone: [phone] Fax: [phone]      Initial Assessment (most recent)       Adult Discharge Assessment - 11/02/23 1027          Discharge Assessment    Assessment Type Discharge Planning Assessment     Confirmed/corrected address, phone number and insurance Yes     Confirmed Demographics Correct on Facesheet     Source of Information patient     When was your last doctors appointment? --   6 weeks ago    Does patient/caregiver understand observation status Yes     Communicated MARIAM with patient/caregiver Date not available/Unable to determine     Reason For Admission Loss of consciousness     People in Home child(alvaro), adult      Facility Arrived From: home     Do you have help at home or someone to help you manage your care at home? Yes     Who are your caregiver(s) and their phone number(s)? Mari Middleton/daughter [phone]     Prior to hospitilization cognitive status: Alert/Oriented     Current cognitive status: Alert/Oriented     Walking or Climbing Stairs ambulation difficulty, requires equipment     Home Accessibility not wheelchair accessible     Equipment Currently Used at Home walker, rolling     Readmission within 30 days? No     Patient currently being followed by outpatient case management? No     Do you currently have service(s) that help you manage your care at home? No     Do you take prescription medications? Yes     Do you have prescription coverage? Yes     Coverage Payor:  MEDICARE - MEDICARE PART A & B     Do you have any problems affording any of your prescribed medications? No     Is the patient taking medications as prescribed? yes     Who is going to help you get home at discharge? Mari Middleton/daughter [phone]     How do you get to doctors appointments? family or friend will provide     Are you on dialysis? No     Do you take coumadin? Yes   Eliquis    DME Needed Upon Discharge  none     Discharge Plan discussed with: Patient     Transition of Care Barriers None     Discharge Plan A Home with family     Discharge Plan B Home

## 2023-11-02 NOTE — H&P
[facility] - Emergency Dept  Hospital Medicine  History & Physical    Patient Name: Jessi Christianson  MRN: [id number]  Patient Class: OP- Observation  Admission Date: 11/1/2023  Attending Physician: Dr. Gill  Primary Care Provider: No primary care provider on file.         Patient information was obtained from patient, relative(s) and ER records.     Subjective:     Principal Problem:Loss of consciousness    Chief Complaint:   Chief Complaint   Patient presents with    Loss of Consciousness     Pt was seen in the ED last weekend for the same complaint then discharged with dehydration. The pt had another syncopal episode that lasted 8 minutes, the daughter was able to help pt to the floor. Pt is alert and oriented x3 in no acute distress.        HPI: 89 y/o female with history of atrial fibrillation, hypercholesterolemia, and HTN presents to the ED after losing consciousness for 8-10 minutes. Patient states that she has been traveling since 10/17 to visit several family members and she is currently in town visiting her daughter. Patient states that she was sitting on a stool at the house when she suddenly felt very hot, started to sweat, and then passed out. Patient's family was able to catch her and ease her to the ground however they stated that she continued to breath but was unconscious for several minutes and was even somnolent in EMS on the way to the ED. Patient states that she had a headache afterwards but denies any cough, SOB, chest pain, abdominal pain, nausea, vomiting, or diarrhea. Patient states that she has been eating normally and drank lots of water yesterday however she did not hydrate much today.      Patient was recently seen 10/29/2023 in the ED for a shorter episode of loss of consciousness, was found to be dehydrated, given fluids, and sent home.     ED: Vitals showed HR 66, /70, RR 16 saturating at 97% on RA. Labs were significant for troponin 7.4, second troponin 7.8, BNP  193, WBC 3.17, and . CT head showed no acute intracranial process and Nonspecific low attenuation in the white matter bilaterally.        No past medical history on file.    No past surgical history on file.    Review of patient's allergies indicates:   Allergen Reactions    Sulfa (sulfonamide antibiotics)     Opioids - morphine analogues Nausea And Vomiting       No current facility-administered medications on file prior to encounter.     Current Outpatient Medications on File Prior to Encounter   Medication Sig    atorvastatin (LIPITOR) 40 MG tablet Take 40 mg by mouth once daily.    celecoxib (CELEBREX) 200 MG capsule Take 200 mg by mouth once daily.    cetirizine (ZYRTEC) 10 MG tablet Take 10 mg by mouth once daily.    diclofenac sodium (VOLTAREN) 1 % Gel Apply 2 g topically 4 (four) times daily.    diltiaZEM (CARDIZEM CD) 180 MG 24 hr capsule Take 180 mg by mouth once daily.    ELIQUIS 5 mg Tab Take 5 mg by mouth 2 (two) times daily.    flecainide (TAMBOCOR) 50 MG Tab Take 50 mg by mouth 2 (two) times daily.    losartan (COZAAR) 50 MG tablet Take 50 mg by mouth 2 (two) times daily.    PREMARIN 0.3 mg tablet Take 0.3 mg by mouth once daily.    [DISCONTINUED] ibandronate (BONIVA) 150 mg tablet Take 1 tablet by mouth every 30 days.     Family History    None       Tobacco Use    Smoking status: Not on file    Smokeless tobacco: Not on file   Substance and Sexual Activity    Alcohol use: Not on file    Drug use: Not on file    Sexual activity: Not on file     Review of Systems   Constitutional:  Negative for activity change, appetite change, chills, diaphoresis, fatigue and fever.   HENT:  Negative for congestion, ear discharge, ear pain, postnasal drip, rhinorrhea, sinus pressure and sore throat.    Eyes:  Negative for pain, discharge, redness and itching.   Respiratory:  Negative for cough, chest tightness and shortness of breath.    Cardiovascular:  Negative for chest pain and leg  swelling.   Gastrointestinal:  Negative for abdominal pain, constipation, diarrhea, nausea and vomiting.   Genitourinary:  Negative for dysuria, frequency, hematuria and urgency.   Musculoskeletal:  Negative for back pain.   Skin:  Negative for color change, pallor and rash.   Neurological:  Positive for syncope, light-headedness and headaches. Negative for dizziness, facial asymmetry, speech difficulty, weakness and numbness.   Psychiatric/Behavioral:  Negative for behavioral problems, confusion and hallucinations.      Objective:     Vital Signs (Most Recent):  Temp: 97.7 °F (36.5 °C) (11/01/23 2000)  Pulse: (!) 55 (11/01/23 2300)  Resp: 19 (11/01/23 2245)  BP: (!) 175/73 (11/01/23 2300)  SpO2: 100 % (11/01/23 2245) Vital Signs (24h Range):  Temp:  [97.7 °F (36.5 °C)] 97.7 °F (36.5 °C)  Pulse:  [55-66] 55  Resp:  [16-19] 19  SpO2:  [97 %-100 %] 100 %  BP: (136-175)/(70-77) 175/73     Weight: 69.9 kg (154 lb)  Body mass index is 25.63 kg/m².     Physical Exam  Vitals and nursing note reviewed.   Constitutional:       General: She is not in acute distress.     Appearance: Normal appearance. She is not ill-appearing, toxic-appearing or diaphoretic.   HENT:      Head: Normocephalic and atraumatic.      Nose: Nose normal.      Mouth/Throat:      Mouth: Mucous membranes are moist.   Eyes:      Extraocular Movements: Extraocular movements intact.   Cardiovascular:      Rate and Rhythm: Normal rate and regular rhythm.      Heart sounds: Normal heart sounds. No murmur heard.  Pulmonary:      Effort: Pulmonary effort is normal. No respiratory distress.      Breath sounds: Normal breath sounds. No wheezing.   Abdominal:      General: Abdomen is flat. Bowel sounds are normal.      Palpations: Abdomen is soft. There is no mass.      Tenderness: There is no abdominal tenderness. There is no guarding.      Hernia: No hernia is present.   Musculoskeletal:         General: No swelling, tenderness or deformity. Normal range of  motion.      Cervical back: Normal range of motion. No rigidity or tenderness.   Skin:     General: Skin is warm and dry.      Coloration: Skin is not jaundiced.      Findings: No bruising or erythema.   Neurological:      General: No focal deficit present.      Mental Status: She is alert and oriented to person, place, and time. Mental status is at baseline.   Psychiatric:         Mood and Affect: Mood normal.         Behavior: Behavior normal.                Significant Labs: All pertinent labs within the past 24 hours have been reviewed.  CBC:   Recent Labs   Lab 11/01/23 2027   WBC 3.17*   HGB 12.7   HCT 38.1   *     CMP:   Recent Labs   Lab 11/01/23 2027      K 4.0      CO2 27   *   BUN 19   CREATININE 1.1   CALCIUM 9.6   PROT 5.9*   ALBUMIN 3.5   BILITOT 0.6   ALKPHOS 60   AST 22   ALT 14   ANIONGAP 4*     Cardiac Markers:   Recent Labs   Lab 11/01/23 2027   *     Magnesium:   Recent Labs   Lab 11/01/23 2027   MG 1.8     Troponin:   Recent Labs   Lab 11/01/23 2027 11/01/23  2223   TROPONINIHS 7.4 7.8     TSH:   Recent Labs   Lab 11/01/23 2027   TSH 1.317       Significant Imaging: I have reviewed all pertinent imaging results/findings within the past 24 hours.    Assessment/Plan:     * Loss of consciousness  Patient presented to the ED after losing consciousness for 8-10 minutes  Patient states that she was sitting in the house and suddenly felt very hot, sweaty, and lightheaded and then passed out  Patient was hard to arouse in the ambulance on the way to the ED but on arrival was more responsive and complaining of a headache    ECG: Sinus rhythm with 1st degree A-V block, Incomplete right bundle branch block, and Left anterior fascicular block    CT Head   1.  No acute intracranial process is identified.  2.  Nonspecific low attenuation in the white matter bilaterally. This is most commonly related to age-indeterminate small vessel ischemic disease,    CTA chest   1. No  pulmonary embolus.  2. Bilateral nonfocal groundglass opacities with mild interlobular septal thickening. Differential includes edema versus pneumonia.    Troponin 7.4  - second troponin 7.8     Drug screen negative     Fall precautions   Seizure precautions    ECHO ordered    Neurology consulted  - EEG ordered          Atrial fibrillation  Patient with atrial fibrillation which is controlled currently with Calcium Channel Blocker and Flecainide. Patient is currently in sinus rhythm.[id number] Score: 3.  Anticoagulation indicated. Anticoagulation done with Home Eliquis.    Hypercholesterolemia  Continue home statin       Hypertensive disorder  Continue home regimen as tolerated   Continue to monitor vitals         VTE Risk Mitigation (From admission, onward)         Ordered     apixaban tablet 5 mg  2 times daily         11/01/23 2307     IP VTE HIGH RISK PATIENT  Once         11/01/23 2307     Place sequential compression device  Until discontinued         11/01/23 2307                     On 11/01/2023, patient should be placed in hospital observation services under my care in collaboration with .      Celine Gruber PA-C  Department of Hospital Medicine  [facility] - Emergency Dept

## 2023-11-02 NOTE — HPI
89 y/o female with history of atrial fibrillation, hypercholesterolemia, and HTN presents to the ED after losing consciousness for 8-10 minutes. Patient states that she has been traveling since 10/17 to visit several family members and she is currently in town visiting her daughter. Patient states that she was sitting on a stool at the house when she suddenly felt very hot, started to sweat, and then passed out. Patient's family was able to catch her and ease her to the ground however they stated that she continued to breath but was unconscious for several minutes and was even somnolent in EMS on the way to the ED. Patient states that she had a headache afterwards but denies any cough, SOB, chest pain, abdominal pain, nausea, vomiting, or diarrhea. Patient states that she has been eating normally and drank lots of water yesterday however she did not hydrate much today.      Patient was recently seen 10/29/2023 in the ED for a shorter episode of loss of consciousness, was found to be dehydrated, given fluids, and sent home.     ED: Vitals showed HR 66, /70, RR 16 saturating at 97% on RA. Labs were significant for troponin 7.4, second troponin 7.8, , WBC 3.17, and . CT head showed no acute intracranial process and Nonspecific low attenuation in the white matter bilaterally.

## 2023-11-03 VITALS
SYSTOLIC BLOOD PRESSURE: 155 MMHG | RESPIRATION RATE: 18 BRPM | TEMPERATURE: 98 F | HEART RATE: 67 BPM | HEIGHT: 65 IN | DIASTOLIC BLOOD PRESSURE: 84 MMHG | OXYGEN SATURATION: 97 % | BODY MASS INDEX: 25.42 KG/M2 | WEIGHT: 152.56 LBS

## 2023-11-03 DIAGNOSIS — U07.1 COVID-19 VIRUS DETECTED: ICD-10-CM

## 2023-11-03 LAB
ALBUMIN SERPL BCP-MCNC: 3.3 G/DL (ref 3.5–5.2)
ALP SERPL-CCNC: 57 U/L (ref 55–135)
ALT SERPL W/O P-5'-P-CCNC: 11 U/L (ref 10–44)
ANION GAP SERPL CALC-SCNC: 7 MMOL/L (ref 8–16)
AST SERPL-CCNC: 16 U/L (ref 10–40)
BASOPHILS # BLD AUTO: 0.01 K/UL (ref 0–0.2)
BASOPHILS NFR BLD: 0.3 % (ref 0–1.9)
BILIRUB SERPL-MCNC: 0.8 MG/DL (ref 0.1–1)
BUN SERPL-MCNC: 16 MG/DL (ref 8–23)
CALCIUM SERPL-MCNC: 9.7 MG/DL (ref 8.7–10.5)
CHLORIDE SERPL-SCNC: 110 MMOL/L (ref 95–110)
CO2 SERPL-SCNC: 24 MMOL/L (ref 23–29)
CREAT SERPL-MCNC: 1 MG/DL (ref 0.5–1.4)
DIFFERENTIAL METHOD: ABNORMAL
EOSINOPHIL # BLD AUTO: 0.1 K/UL (ref 0–0.5)
EOSINOPHIL NFR BLD: 2.1 % (ref 0–8)
ERYTHROCYTE [DISTWIDTH] IN BLOOD BY AUTOMATED COUNT: 13.4 % (ref 11.5–14.5)
EST. GFR  (NO RACE VARIABLE): 54.9 ML/MIN/1.73 M^2
GLUCOSE SERPL-MCNC: 87 MG/DL (ref 70–110)
HCT VFR BLD AUTO: 39.7 % (ref 37–48.5)
HGB BLD-MCNC: 13.2 G/DL (ref 12–16)
IMM GRANULOCYTES # BLD AUTO: 0.01 K/UL (ref 0–0.04)
IMM GRANULOCYTES NFR BLD AUTO: 0.3 % (ref 0–0.5)
LYMPHOCYTES # BLD AUTO: 2 K/UL (ref 1–4.8)
LYMPHOCYTES NFR BLD: 52.2 % (ref 18–48)
MCH RBC QN AUTO: 29.3 PG (ref 27–31)
MCHC RBC AUTO-ENTMCNC: 33.2 G/DL (ref 32–36)
MCV RBC AUTO: 88 FL (ref 82–98)
MONOCYTES # BLD AUTO: 0.4 K/UL (ref 0.3–1)
MONOCYTES NFR BLD: 10.5 % (ref 4–15)
NEUTROPHILS # BLD AUTO: 1.4 K/UL (ref 1.8–7.7)
NEUTROPHILS NFR BLD: 34.6 % (ref 38–73)
NRBC BLD-RTO: 0 /100 WBC
PLATELET # BLD AUTO: 159 K/UL (ref 150–450)
PMV BLD AUTO: 11.2 FL (ref 9.2–12.9)
POTASSIUM SERPL-SCNC: 3.8 MMOL/L (ref 3.5–5.1)
POTASSIUM SERPL-SCNC: 4.9 MMOL/L (ref 3.5–5.1)
PROT SERPL-MCNC: 5.6 G/DL (ref 6–8.4)
RBC # BLD AUTO: 4.5 M/UL (ref 4–5.4)
SODIUM SERPL-SCNC: 141 MMOL/L (ref 136–145)
WBC # BLD AUTO: 3.89 K/UL (ref 3.9–12.7)

## 2023-11-03 PROCEDURE — 25000003 PHARM REV CODE 250

## 2023-11-03 PROCEDURE — 84132 ASSAY OF SERUM POTASSIUM: CPT | Performed by: INTERNAL MEDICINE

## 2023-11-03 PROCEDURE — 36415 COLL VENOUS BLD VENIPUNCTURE: CPT

## 2023-11-03 PROCEDURE — 80053 COMPREHEN METABOLIC PANEL: CPT

## 2023-11-03 PROCEDURE — 97116 GAIT TRAINING THERAPY: CPT

## 2023-11-03 PROCEDURE — 85025 COMPLETE CBC W/AUTO DIFF WBC: CPT

## 2023-11-03 PROCEDURE — 97161 PT EVAL LOW COMPLEX 20 MIN: CPT

## 2023-11-03 PROCEDURE — 36415 COLL VENOUS BLD VENIPUNCTURE: CPT | Performed by: INTERNAL MEDICINE

## 2023-11-03 RX ADMIN — APIXABAN 5 MG: 5 TABLET, FILM COATED ORAL at 09:11

## 2023-11-03 RX ADMIN — CELECOXIB 200 MG: 100 CAPSULE ORAL at 09:11

## 2023-11-03 RX ADMIN — ATORVASTATIN CALCIUM 40 MG: 40 TABLET, FILM COATED ORAL at 09:11

## 2023-11-03 RX ADMIN — FLECAINIDE ACETATE 50 MG: 50 TABLET ORAL at 09:11

## 2023-11-03 RX ADMIN — DILTIAZEM HYDROCHLORIDE 180 MG: 180 CAPSULE, COATED, EXTENDED RELEASE ORAL at 09:11

## 2023-11-03 RX ADMIN — LOSARTAN POTASSIUM 50 MG: 50 TABLET, FILM COATED ORAL at 09:11

## 2023-11-03 RX ADMIN — ACETAMINOPHEN 650 MG: 325 TABLET ORAL at 12:11

## 2023-11-03 RX ADMIN — POTASSIUM BICARBONATE 50 MEQ: 977.5 TABLET, EFFERVESCENT ORAL at 09:11

## 2023-11-03 RX ADMIN — Medication 800 MG: at 12:11

## 2023-11-03 RX ADMIN — Medication 800 MG: at 09:11

## 2023-11-03 NOTE — PROCEDURES
EEG REPORT    NAME: Jessi Christianson  : 1935  MRN: [id number]    DATE of EE2023    CLINICAL INDICATION: This is a 88 y.o. female being evaluated for syncopal spells.    MEDICATIONS:    apixaban  5 mg Oral BID    atorvastatin  40 mg Oral Daily    celecoxib  200 mg Oral Daily    diltiaZEM  180 mg Oral Daily    flecainide  50 mg Oral BID    losartan  50 mg Oral BID         EEG DESCRIPTION:  A 16-channel EEG was performed with electrode placement in 10/20 International System. Longitudinal bipolar and referential montages were utilized in analysis.    This is a technically adequate study with minor muscle and motion artifacts.    The dominant posterior background rhythm was a well modulated, symmetric, synchronous, reactive, 8-9 Hz rhythm.    The record was reactive to eye opening and closure. Anterior derivations showed the expected admixture of low-voltage beta and theta frequencies as well as intermittent eye blink artifact.    Drowsiness was characterized by voltage attenuation and lateral eye movements.    Sleep architecture was symmetric and consisted of sleep spindles and K complexes.    Photic stimulation and Hyperventilation  was not performed.    No epileptiform discharges were recorded. No electrographic or electroclinical seizures were recorded.    INTERPRETATION:  This is a normal EEG. No lateralizing or epileptiform features are noted. No electrographic or electroclinical seizures are recorded.      Josue Rollins MD  Neurology

## 2023-11-03 NOTE — PT/OT/SLP EVAL
Physical Therapy Evaluation    Patient Name:  Jessi Christianson   MRN:  [id number]    Recommendations:     Discharge Recommendations: Low Intensity Therapy   Discharge Equipment Recommendations: walker, rolling   Barriers to discharge:  high fall risk, stairs to access home, impaired balance    Assessment:     Jessi Christianson is a 88 y.o. female admitted with a medical diagnosis of Loss of consciousness.  She presents with the following impairments/functional limitations: weakness, impaired endurance, impaired self care skills, impaired functional mobility, gait instability, impaired balance, decreased lower extremity function, decreased safety awareness, impaired cardiopulmonary response to activity.    Pt found in bed with HOB elevated. Pt agreeable to visit. Pt presents with impaired balance with ambulation with no AD. Significant improvement in ambulation and stability with RW. Pt reports that she has a RW but it is at her home in [place]. She is currently her visiting her daughter. Pt reports daughter's home has stairs which is a concern to her and discussed taking it one steps at a time with assist. BP seated /80, post ambulation no /82, post ambulation with /85    Rehab Prognosis: Fair; patient would benefit from acute skilled PT services to address these deficits and reach maximum level of function.    Recent Surgery: * No surgery found *      Plan:     During this hospitalization, patient to be seen 6 x/week to address the identified rehab impairments via gait training, therapeutic activities, therapeutic exercises, neuromuscular re-education and progress toward the following goals:    Plan of Care Expires:  12/03/23    Subjective     Chief Complaint: concerned about stairs at daughter's home  Patient/Family Comments/goals: return home to daughter's home  Pain/Comfort:  Pain Rating 1: 0/10    Patients cultural, spiritual, Ep[employer]opalian conflicts given the current situation: no    Living  Environment:  Pt lives in [place] in a one story home (+) , currently visiting daughter in a two story home, personal RW is at her home in [place].  Prior to admission, patients level of function was Independent occ RW use for community mobility.  Equipment used at home: walker, rolling, rollator.  DME owned (not currently used): none.  Upon discharge, patient will have assistance from daughter.    Objective:     Communicated with RW prior to session.  Patient found HOB elevated with peripheral IV, telemetry  upon PT entry to room.    General Precautions: Standard, fall  Orthopedic Precautions:N/A   Braces: N/A  Respiratory Status: Room air    Exams:  Cognitive Exam:  Patient is oriented to Person, Place, Time, and Situation  RLE ROM: WFL  RLE Strength: WFL  LLE ROM: WFL  LLE Strength: WFL    Functional Mobility:  Bed Mobility:     Supine to Sit: supervision  Sit to Supine: supervision  Transfers:     Sit to Stand:  contact guard assistance with no AD/ supervision with RW  Gait: 25 ft with no AD and min A, 80 ft with RW and supervision      AM-PAC 6 CLICK MOBILITY  Total Score:20       Treatment & Education:  Pt educated on POC, discharge recommendation, importance of time OOB, benefit of RW, need for assist with mobility, use of call bell to seek assistance as needed and fall prevention      Patient left HOB elevated with all lines intact, call button in reach, bed alarm on, and MD and case management notified.    GOALS:   Multidisciplinary Problems       Physical Therapy Goals          Problem: Physical Therapy    Goal Priority Disciplines Outcome Goal Variances Interventions   Physical Therapy Goal     PT, PT/OT Ongoing, Progressing     Description: Goals to be met by: 12/3/23     Patient will increase functional independence with mobility by performin. Supine to sit with Independent  2. Sit to stand transfer with Supervision  3. Bed to chair transfer with Supervision using Rolling Walker  4. Gait   x 150 feet with Supervision using Rolling Walker.                              History:     No past medical history on file.    No past surgical history on file.    Time Tracking:     PT Received On: 11/03/23  PT Start Time: 1415     PT Stop Time: 1437  PT Total Time (min): 22 min     Billable Minutes: Evaluation 10 and Gait Training 12      11/03/2023

## 2023-11-03 NOTE — PLAN OF CARE
Patient cleared for discharge from case management standpoint.    Follow up appointments scheduled and added to AVS.    CM sent order for rolling walker to [facility]. Pt paid for rolling walker and Kiet approved pulling from [facility] [employer]. Walker delivered to pt.    CM s/w Mari. Pt plans to follow up with cardiologist and PCP when she returns home to [place].    CM sent home health order to [facility] via [employer]. CM messaged ADRIEL Armas planned for 11/4.    Chart and discharge orders reviewed.  Patient discharged home with [facility] no further case management needs.       11/03/23 1620   Final Note   Assessment Type Final Discharge Note   Anticipated Discharge Disposition [facility] Resources/Appts/Education Provided Provided patient/caregiver with written discharge plan information;Post-Acute resouces added to AVS   Post-Acute Status   Post-Acute Authorization Home [facility]   Home Health Status Set-up Complete/Auth obtained   Patient choice form signed by patient/caregiver List with quality metrics by geographic area provided   Discharge Delays (!) Personal Transportation

## 2023-11-03 NOTE — PROGRESS NOTES
[facility]  Department of Neurology  Progress Note  Date: 2023 1:57 PM          Patient Name: Jessi Christianson   MRN: [id number]   : 1935    AGE: 88 y.o.    LOS: 1 days Hospital Day: 3  Admit date: 2023  8:05 PM       HPI per EMR: Jessi Christianson is a 88 y.o. female with a history of atrial fibrillation, hypercholesterolemia, and HTN presents to the ED after losing consciousness for 8-10 minutes. Patient states that she has been traveling since 10/17 to visit several family members and she is currently in town visiting her daughter. Patient states that she was sitting on a stool at the house when she suddenly felt very hot, started to sweat, and then passed out. Patient's family was able to catch her and ease her to the ground however they stated that she continued to breath but was unconscious for several minutes and was even somnolent in EMS on the way to the ED. Patient states that she had a headache afterwards but denies any cough, SOB, chest pain, abdominal pain, nausea, vomiting, or diarrhea. Patient states that she has been eating normally and drank lots of water yesterday however she did not hydrate much today.       Patient was recently seen 10/29/2023 in the ED for a shorter episode of loss of consciousness, was found to be dehydrated, given fluids, and sent home.      ED: Vitals showed HR 66, /70, RR 16 saturating at 97% on RA. Labs were significant for troponin 7.4, second troponin 7.8, , WBC 3.17, and . CT head showed no acute intracranial process and Nonspecific low attenuation in the white matter bilaterally.     Neurology consult:  Patient was seen examined by me.  She presented to the hospital after episode of syncopal spell that happened yesterday while she was sitting on the stool and suddenly felt like room spinning sensation and then she passed out.  She was unconscious for about 5-7 minutes after which she started to wake up however was somnolent  "after.  She felt very hot and sweaty prior to passing out.  Patient's family was able to catch her and helped her to the ground.  There was no generalized tonic-clonic activity.     Two days prior to this episode, she had a similar episode while she was standing in her daughter's office room when she passed out and she was brought to the hospital and she was found to be dehydrated.  She was given fluids and discharged home.     Patient denies any headache, vision loss, vision changes, speech trouble, nausea, vomiting, unilateral weakness or sensory changes.  Currently she feels back to baseline.    11/03/2023: No acute events overnight. Patient was seen and examined by me this morning. Neuro exam is normal and no further episodes of syncopal spells in the hospital       Vitals:  Patient Vitals for the past 24 hrs:   BP Temp Temp src Pulse Resp SpO2 Height Weight   11/03/23 1100 (!) 148/82 97.9 °F (36.6 °C) Oral 66 18 95 % -- --   11/03/23 0756 (!) 161/88 97.7 °F (36.5 °C) Oral 69 18 99 % -- --   11/03/23 0350 (!) 156/87 97.6 °F (36.4 °C) Oral 68 18 97 % -- 69.2 kg (152 lb 8.9 oz)   11/02/23 2335 (!) 170/83 98 °F (36.7 °C) Oral 62 20 98 % -- --   11/02/23 1935 126/84 98.1 °F (36.7 °C) Oral 66 (!) 22 96 % 5' 5" (1.651 m) 68.9 kg (151 lb 14.4 oz)   11/02/23 1730 (!) 154/69 -- -- (!) 58 (!) 23 97 % -- --   11/02/23 1700 (!) 148/66 -- -- 60 (!) 24 96 % -- --   11/02/23 1630 (!) 146/68 -- -- 60 (!) 22 97 % -- --   11/02/23 1600 (!) 140/64 -- -- (!) 57 (!) 25 96 % -- --   11/02/23 1530 (!) 141/66 -- -- 65 (!) 34 98 % -- --   11/02/23 1502 130/62 -- -- 63 -- 98 % -- --     PHYSICAL EXAM:     GENERAL APPEARANCE: Well-developed, well-nourished female in no acute distress.  HEENT: Normocephalic and atraumatic. PERRL. Oropharynx unremarkable.  PULM: Comfortable on room air.  CV: RRR.  ABDOMEN: Soft, nontender.  EXTREMITIES: No signs of vascular compromise. Pulses present. No cyanosis, clubbing or edema.  SKIN: Clear; no rashes, " "lesions or skin breaks in exposed areas.      NEURO:   MENTAL STATUS: Patient awake and oriented to time, place, and person. Affect normal.  CRANIAL NERVES II-XII: Pupils equal, round and reactive to light. Extraocular movements full and intact. No facial asymmetry.  MOTOR: Normal bulk. Tone normal and symmetrical throughout.  No abnormal movements. No tremor.   Strength 5/5 throughout unless specified below.  REFLEXES: DTRs 2+; normal and symmetric throughout.   SENSATION: Sensation grossly intact to fine touch.  COORDINATION: Finger-to-nose normal for age and symmetric.  STATION: Romberg deferred.  GAIT: Deferred.    CURRENT SCHEDULED MEDICATIONS:   apixaban  5 mg Oral BID    atorvastatin  40 mg Oral Daily    celecoxib  200 mg Oral Daily    diltiaZEM  180 mg Oral Daily    flecainide  50 mg Oral BID    losartan  50 mg Oral BID     CURRENT INFUSIONS:    DATA:  Recent Labs   Lab 10/29/23  1208 11/01/23  2027 11/02/23  0427 11/03/23  0459 11/03/23  1205    139 139 141  --    K 3.8 4.0 3.8 3.8 4.9    108 109 110  --    CO2 25 27 26 24  --    BUN 15 19 17 16  --    CREATININE 1.3 1.1 1.0 1.0  --    GLU 91 130* 82 87  --    CALCIUM 8.6* 9.6 8.9 9.7  --    MG  --  1.8  --   --   --    AST 32 22 20 16  --    ALT 18 14 13 11  --      Recent Labs   Lab 10/29/23  1208 11/01/23 2027 11/02/23 0427 11/03/23  0459   WBC 3.91 3.17* 3.38* 3.89*   HGB 12.1 12.7 12.1 13.2   HCT 36.4* 38.1 36.3* 39.7   * 133* 139* 159     No results found for: "PROTEINCSF", "GLUCCSF", "WBCCSF", "RBCCSF", "PMNCSF"  No results found for: "HGBA1C"         I have personally reviewed and interpreted the pertinent imaging and lab results.  Imaging Results              US Carotid Bilateral (Final result)  Result time 11/02/23 00:49:36      Final result by Alvarado España MD (11/02/23 00:49:36)                   Narrative:    EXAM:  US Duplex Bilateral Extracranial Arteries  CLINICAL HISTORY:  88 years old, Female; " syncope;  TECHNIQUE:  Real-time duplex ultrasound scan of the extracranial arteries integrating B-mode two-dimensional vascular structure, Doppler spectral analysis and color flow Doppler imaging.  COMPARISON:  No relevant prior studies available.    FINDINGS:    Normal antegrade flow in the bilateral carotid and left vertebral arteries. Image of the right vertebral artery not available.  Mild plaque in the carotid bulbs bilaterally.    Lowest peak CCA systolic velocity measurements are listed below (cm/sec):  Right: [96 ],  Left: [ 61]  Highest peak ICA systolic velocity measurements are listed below (cm/sec):  Right: [ 49-58],  Left: [ ]  ICA/CCA ratios:  Right: [0.6 ],  Left: [ 1.8]    IMPRESSION:  No evidence of hemodynamically significant stenosis bilaterally (less than 50%).      Electronically signed by:  Alvarado España MD  11/02/2023 12:49 AM CDT Workstation: [id number]                                     CTA Chest Non-Coronary (PE Studies) (Final result)  Result time 11/01/23 23:36:36      Final result by Abhishek Vaca MD (11/01/23 23:36:36)                   Narrative:    EXAM DESCRIPTION:  CTA CHEST NON CORONARY (PE STUDIES)    CLINICAL HISTORY:  88 years  Female  wide appearing mediastinum on CXR    COMPARISON: None.    TECHNIQUE:  Axial CT angiography of the chest was performed with multiplanar reformation and 3D and MIP reconstruction. This exam was performed according to our departmental dose-optimization program, which includes automated exposure control, adjustment of the mA and/or kV according to patient size and/or use of iterative reconstruction technique.    FINDINGS:  Pulmonary arteries: No evidence of pulmonary embolism.  Aorta: No evidence of aortic dissection or aneurysm. Atherosclerosis of the aorta.    Mediastinum: Unremarkable. No adenopathy.  Heart:  Heart is prominent. No pericardial effusion.    Lungs / airways: Bilateral nonfocal groundglass opacities with mild  interlobular septal thickening. No consolidation. Airways are patent.    Pleura: No significant pleural effusion. No pneumothorax.    Osseous: Multilevel degenerative changes. Chronic appearing L1 vertebral body fracture.  Soft tissues: Unremarkable.    Visualized upper abdomen: Unremarkable.    IMPRESSION:  1. No pulmonary embolus.  2. Bilateral nonfocal groundglass opacities with mild interlobular septal thickening. Differential includes edema versus pneumonia.    Electronically signed by:  Abhishek Vaca MD  11/01/2023 11:36 PM CDT Workstation: [id number]                                     CT Head Without Contrast (Final result)  Result time 11/01/23 22:00:05      Final result by Lobito Crowell MD (11/01/23 22:00:05)                   Narrative:    EXAM DESCRIPTION:  CT HEAD WITHOUT CONTRAST  RadLex: CT HEAD WITHOUT IV CONTRAST    CLINICAL HISTORY:  88 years  Female;  Syncope, recurrent    TECHNOLOGIST NOTES: Loss of Consciousness (Pt was seen in the ED last weekend for the same complaint then discharged with dehydration. The pt had another syncopal episode that lasted 8 minutes, the daughter was able to help pt to the floor. Pt is alert and oriented x3 in; no acute distress.)    COMPARISONS: 10/29/2023    TECHNIQUE: Axial CT images were obtained from the skull base to vertex. This exam was performed according to our departmental dose-optimization program, which includes automated exposure control, adjustment of the mA and/or kV according to patient size and/or use of iterative reconstruction techniques.    FINDINGS:  No acute intracranial hemorrhage. No mass effect, midline shift or hydrocephalus. The gray-white matter differentiation is grossly unremarkable. No evidence of acute large territory infarct.   Within the broad range of normal, brain volume is age appropriate. Nonspecific low attenuation in the white matter bilaterally. This is most commonly related to age-indeterminate small vessel ischemic  disease. The visualized paranasal sinuses are grossly unremarkable. The mastoid air cells are clear.      IMPRESSION:  1.  No acute intracranial process is identified.  2.  Nonspecific low attenuation in the white matter bilaterally. This is most commonly related to age-indeterminate small vessel ischemic disease,  3.  If symptoms persist or further imaging is clinically indicated, consider follow-up MRI or repeat CT imaging    Electronically signed by:  Lobito Crowell MD  11/01/2023 10:00 PM CDT Workstation: [id number]                                     X-Ray Chest AP Portable (Final result)  Result time 11/02/23 07:04:34      Final result by Sridhar Hermosillo MD (11/02/23 07:04:34)                   Narrative:    XR CHEST 1 VIEW    CLINICAL HISTORY:  88 years Female Chest Pain    COMPARISON: CT chest November 1, 2023    FINDINGS: Cardiac silhouette size is borderline enlarged. Atherosclerotic calcification of the aorta. No airspace consolidation, pleural effusion, or pneumothorax. Osteopenia. No acute osseous abnormality.    IMPRESSION:    No acute pulmonary pathology.    Electronically signed by:  Sridhar Hermosillo MD  11/02/2023 07:04 AM CDT Workstation: [id number]                                            ASSESSMENT AND PLAN:     Syncopal spells  COVID     Plan:   Etiology of syncopal spells is unknown.  There was no tonic-clonic activity with either of the syncopal spells.  No prior history of seizures and hence concern for seizures as a cause of syncopal spells is low. Etiology of syncope is unknown and could likely be from COVID  EEG was done and was normal  CT head was negative for acute intracranial pathology, Carotid ultrasound did not reveal any evidence of significant stenosis bilaterally  Cardiac workup for syncopal spells  Checked orthostatic vitals and unrevealing  PT OT evaluate and treat  Will follow as needed. Please call with questions            Josue Rollins MD  Neurology/vascular  Neurology  Date of Service: 11/03/2023  1:57 PM    Please note: This note was transcribed using voice recognition software. Because of this technology there are often uinintended grammatical, spelling, and other transcription errors. Please disregard these errors.

## 2023-11-03 NOTE — PLAN OF CARE
Problem: Infection  Goal: Absence of Infection Signs and Symptoms  Outcome: Met     Problem: Adult Inpatient Plan of Care  Goal: Plan of Care Review  Outcome: Met  Goal: Patient-Specific Goal (Individualized)  Outcome: Met  Goal: Absence of Hospital-Acquired Illness or Injury  Outcome: Met  Goal: Optimal Comfort and Wellbeing  Outcome: Met  Goal: Readiness for Transition of Care  Outcome: Met     Problem: Impaired Wound Healing  Goal: Optimal Wound Healing  Outcome: Met     Problem: Fall Injury Risk  Goal: Absence of Fall and Fall-Related Injury  Outcome: Met     Problem: Skin Injury Risk Increased  Goal: Skin Health and Integrity  Outcome: Met

## 2023-11-03 NOTE — NURSING
Nurses Note -- 4 Eyes      11/2/2023   10:21 PM      Skin assessed during: Admit      [] No Altered Skin Integrity Present    []Prevention Measures Documented      [x] Yes- Altered Skin Integrity Present or Discovered   [x] LDA Added if Not in Epic (Describe Wound)   [x] New Altered Skin Integrity was Present on Admit and Documented in LDA   [x] Wound Image Taken    Wound Care Consulted? Yes    Attending Nurse:  Tamara Chinchilla RN/Staff Member:   [id number]

## 2023-11-04 NOTE — HOSPITAL COURSE
87 y/o female with history of atrial fibrillation, hypercholesterolemia, and HTN presents to the ED after losing consciousness for 8-10 minutes. She was seen in the ED 10/29 also for syncope but was found to be volume depleted, given IVFs and discharged home. She had a second episode yesterday.  She reports episodic lightheadedness but none recently and it does not sound like she's had an official workup. No events thus far on tele monitoring.  Echo done with results pending.  Troponin x 2 is normal. Orthostatics in the ED were normal. CT head with no acute process. CTA chest done given recent travel- no PE but had bilateral ground glass opacities.  She does report cough for one week.  No fevers.  Covid screen done and was positive. Some loose stool approximately 5 days ago but none since. Eating and drinking okay. Carotid US with no significant stenosis.  Neurology consulted.  EEG ordered and no seizure activity noted.  No reported Hx of seizure. I discussed with Dr. Rollins and seizure is not considered highly likely.  No events on tele monitoring- HR got to 49 intermittently but not persistent.  No hypotension.  I suspect syncope may be related to her covid 19infection though she will keep her upcoming apt with PCP and Cardiology in her [place] hometown for further evaluation. Seen by PT and requires a walker as she is presently unsteady-  supplied her daughter with purchasing info.  HH arranged if she will be in this area for a while recovering under her daughter's care.  Examined on day of discharge and alert, NAD, comfortable respirations on room air. Return precautions given.

## 2023-11-04 NOTE — DISCHARGE SUMMARY
[facility] Medicine  Discharge Summary      Patient Name: Jessi Christianson  MRN: [id number]  DEDE: [id number]  Patient Class: IP- Inpatient  Admission Date: 11/1/2023  Hospital Length of Stay: 1 days  Discharge Date and Time: 11/3/2023  6:32 PM  Attending Physician: Smita att. providers found   Discharging Provider: June Gomez MD  Primary Care Provider: Smita, Primary Doctor    Primary Care Team: Networked reference to record PCT     HPI:   89 y/o female with history of atrial fibrillation, hypercholesterolemia, and HTN presents to the ED after losing consciousness for 8-10 minutes. Patient states that she has been traveling since 10/17 to visit several family members and she is currently in town visiting her daughter. Patient states that she was sitting on a stool at the house when she suddenly felt very hot, started to sweat, and then passed out. Patient's family was able to catch her and ease her to the ground however they stated that she continued to breath but was unconscious for several minutes and was even somnolent in EMS on the way to the ED. Patient states that she had a headache afterwards but denies any cough, SOB, chest pain, abdominal pain, nausea, vomiting, or diarrhea. Patient states that she has been eating normally and drank lots of water yesterday however she did not hydrate much today.      Patient was recently seen 10/29/2023 in the ED for a shorter episode of loss of consciousness, was found to be dehydrated, given fluids, and sent home.     ED: Vitals showed HR 66, /70, RR 16 saturating at 97% on RA. Labs were significant for troponin 7.4, second troponin 7.8, , WBC 3.17, and . CT head showed no acute intracranial process and Nonspecific low attenuation in the white matter bilaterally.        * No surgery found *      Hospital Course:   89 y/o female with history of atrial fibrillation, hypercholesterolemia, and HTN presents to the ED after losing  consciousness for 8-10 minutes. She was seen in the ED 10/29 also for syncope but was found to be volume depleted, given IVFs and discharged home. She had a second episode yesterday.  She reports episodic lightheadedness but none recently and it does not sound like she's had an official workup. No events thus far on tele monitoring.  Echo done with results pending.  Troponin x 2 is normal. Orthostatics in the ED were normal. CT head with no acute process. CTA chest done given recent travel- no PE but had bilateral ground glass opacities.  She does report cough for one week.  No fevers.  Covid screen done and was positive. Some loose stool approximately 5 days ago but none since. Eating and drinking okay. Carotid US with no significant stenosis.  Neurology consulted.  EEG ordered and no seizure activity noted.  No reported Hx of seizure. I discussed with Dr. Rollins and seizure is not considered highly likely.  No events on tele monitoring- HR got to 49 intermittently but not persistent.  No hypotension.  I suspect syncope may be related to her covid 19infection though she will keep her upcoming apt with PCP and Cardiology in her [place] hometown for further evaluation. Seen by PT and requires a walker as she is presently unsteady-  supplied her daughter with purchasing info.  HH arranged if she will be in this area for a while recovering under her daughter's care.  Examined on day of discharge and alert, NAD, comfortable respirations on room air. Return precautions given.          Goals of Care Treatment Preferences:  Code Status: DNR      Consults:   Consults (From admission, onward)        Status Ordering Provider     Inpatient consult to Social Work/Case Management  Once        Provider:  (Not yet assigned)    Acknowledged DEEPTI SANDOVAL     Inpatient consult to Neurology  Once        Provider:  Josue Rollins MD    Completed RUBIO HANSON          No new Assessment & Plan notes have been filed under this  "hospital service since the last note was generated.  Service: Hospital Medicine    Final Active Diagnoses:    Diagnosis Date Noted POA    PRINCIPAL PROBLEM:  Loss of consciousness [R40.20] 11/01/2023 Yes    Hypertensive disorder [I10] 11/01/2023 Yes    Hypercholesterolemia [E78.00] 11/01/2023 Yes    Atrial fibrillation [I48.91] 11/01/2023 Yes      Problems Resolved During this Admission:       Discharged Condition: good    Disposition: Home-Health Care Svc    Follow Up:   Follow-up Information     PCP and Cardiology Follow up.    Why: Please keep these previously scheduled upcoming appointments for post hospital discharge secondary to passing out.           [facility] Follow up.    Specialties: Physical Therapy, Occupational Therapy, Home Health Services  Contact information:  [id number] [address] [id number] [phone]                       Patient Instructions:      WALKER FOR HOME USE     Order Specific Question Answer Comments   Type of Walker: Adult (5'4"-6'6")    With wheels? Yes    Height: 5' 5" (1.651 m)    Weight: 69.2 kg (152 lb 8.9 oz)    Length of need (1-99 months): 6    Does patient have medical equipment at home? walker, rolling    Does patient have medical equipment at home? rollator    Please check all that apply: Patient's condition impairs ambulation.    Please check all that apply: Patient is unable to safely ambulate without equipment.      Ambulatory referral/consult to Outpatient Case Management   Referral Priority: Routine Referral Type: Consultation   Referral Reason: Specialty Services Required   Number of Visits Requested: 1     Ambulatory referral/consult to Home Health   Standing Status: Future   Referral Priority: Routine Referral Type: Home Health Care   Referral Reason: Specialty Services Required   Requested Specialty: Home Health Services   Number of Visits Requested: 1     Ambulatory referral/consult to [facility]   Standing Status: " "Future   Referral Priority: Routine Referral Type: Consultation   Referral Reason: Specialty Services Required   Number of Visits Requested: 1     Ambulatory referral/consult to Outpatient Case Management   Referral Priority: Routine Referral Type: Consultation   Referral Reason: Specialty Services Required   Number of Visits Requested: 1     Diet Cardiac     Notify your health care provider if you experience any of the following:  increased confusion or weakness     Notify your health care provider if you experience any of the following:  difficulty breathing or increased cough     Notify your health care provider if you experience any of the following:  persistent dizziness, light-headedness, or visual disturbances     Activity as tolerated       Significant Diagnostic Studies: Labs:   CMP   Recent Labs   Lab 11/01/23 2027 11/02/23 0427 11/03/23 0459 11/03/23  1205    139 141  --    K 4.0 3.8 3.8 4.9    109 110  --    CO2 27 26 24  --    * 82 87  --    BUN 19 17 16  --    CREATININE 1.1 1.0 1.0  --    CALCIUM 9.6 8.9 9.7  --    PROT 5.9* 5.5* 5.6*  --    ALBUMIN 3.5 3.2* 3.3*  --    BILITOT 0.6 0.6 0.8  --    ALKPHOS 60 54* 57  --    AST 22 20 16  --    ALT 14 13 11  --    ANIONGAP 4* 4* 7*  --    , CBC   Recent Labs   Lab 11/01/23 2027 11/02/23 0427 11/03/23 0459   WBC 3.17* 3.38* 3.89*   HGB 12.7 12.1 13.2   HCT 38.1 36.3* 39.7   * 139* 159    and Troponin No results for input(s): "TROPONINI" in the last 168 hours.    Pending Diagnostic Studies:     None         Medications:  Reconciled Home Medications:      Medication List      CONTINUE taking these medications    atorvastatin 40 MG tablet  Commonly known as: LIPITOR  Take 40 mg by mouth once daily.     celecoxib 200 MG capsule  Commonly known as: CeleBREX  Take 200 mg by mouth once daily.     cetirizine 10 MG tablet  Commonly known as: ZYRTEC  Take 10 mg by mouth once daily.     diclofenac sodium 1 % Gel  Commonly known as: " VOLTAREN  Apply 2 g topically 4 (four) times daily.     diltiaZEM 180 MG 24 hr capsule  Commonly known as: CARDIZEM CD  Take 180 mg by mouth once daily.     ELIQUIS 5 mg Tab  Generic drug: apixaban  Take 5 mg by mouth 2 (two) times daily.     flecainide 50 MG Tab  Commonly known as: TAMBOCOR  Take 50 mg by mouth 2 (two) times daily.     losartan 50 MG tablet  Commonly known as: COZAAR  Take 50 mg by mouth 2 (two) times daily.     PREMARIN 0.3 MG tablet  Generic drug: estrogens (conjugated)  Take 0.3 mg by mouth once daily.            Indwelling Lines/Drains at time of discharge:   Lines/Drains/Airways     None                 Time spent on the discharge of patient: 36 minutes         June Gomez MD  Department of Hospital Medicine  [facility]

## 2023-11-06 ENCOUNTER — PES CALL (OUTPATIENT)
Dept: HOME HEALTH SERVICES | Facility: CLINIC | Age: 88
End: 2023-11-06
Payer: MEDICARE

## 2023-11-07 ENCOUNTER — PES CALL (OUTPATIENT)
Dept: HOME HEALTH SERVICES | Facility: CLINIC | Age: 88
End: 2023-11-07
Payer: MEDICARE

## 2023-11-08 ENCOUNTER — PES CALL (OUTPATIENT)
Dept: HOME HEALTH SERVICES | Facility: CLINIC | Age: 88
End: 2023-11-08
Payer: MEDICARE

## 2023-11-29 ENCOUNTER — DOCUMENT SCAN (OUTPATIENT)
Dept: HOME HEALTH SERVICES | Facility: HOSPITAL | Age: 88
End: 2023-11-29
Payer: MEDICARE

## 2023-12-04 ENCOUNTER — EXTERNAL HOME HEALTH (OUTPATIENT)
Dept: HOME HEALTH SERVICES | Facility: HOSPITAL | Age: 88
End: 2023-12-04
Payer: MEDICARE